# Patient Record
Sex: FEMALE | Race: BLACK OR AFRICAN AMERICAN | Employment: UNEMPLOYED | URBAN - METROPOLITAN AREA
[De-identification: names, ages, dates, MRNs, and addresses within clinical notes are randomized per-mention and may not be internally consistent; named-entity substitution may affect disease eponyms.]

---

## 2023-02-15 ENCOUNTER — HOSPITAL ENCOUNTER (EMERGENCY)
Age: 48
Discharge: NURSING, REHAB OR LONG-TERM CARE | End: 2023-02-15
Attending: EMERGENCY MEDICINE
Payer: MEDICARE

## 2023-02-15 ENCOUNTER — APPOINTMENT (OUTPATIENT)
Dept: GENERAL RADIOLOGY | Age: 48
End: 2023-02-15
Attending: EMERGENCY MEDICINE
Payer: MEDICARE

## 2023-02-15 VITALS
RESPIRATION RATE: 16 BRPM | OXYGEN SATURATION: 98 % | TEMPERATURE: 99 F | HEIGHT: 58 IN | WEIGHT: 180 LBS | HEART RATE: 112 BPM | SYSTOLIC BLOOD PRESSURE: 120 MMHG | BODY MASS INDEX: 37.79 KG/M2 | DIASTOLIC BLOOD PRESSURE: 71 MMHG

## 2023-02-15 DIAGNOSIS — J98.11 ATELECTASIS: ICD-10-CM

## 2023-02-15 DIAGNOSIS — N39.0 URINARY TRACT INFECTION WITHOUT HEMATURIA, SITE UNSPECIFIED: Primary | ICD-10-CM

## 2023-02-15 DIAGNOSIS — E87.0 HYPERNATREMIA: ICD-10-CM

## 2023-02-15 LAB
ALBUMIN SERPL-MCNC: 3.3 G/DL (ref 3.5–5)
ALBUMIN/GLOB SERPL: 0.6 (ref 1.1–2.2)
ALP SERPL-CCNC: 86 U/L (ref 45–117)
ALT SERPL-CCNC: 22 U/L (ref 12–78)
ANION GAP SERPL CALC-SCNC: 7 MMOL/L (ref 5–15)
APPEARANCE UR: ABNORMAL
AST SERPL W P-5'-P-CCNC: ABNORMAL U/L (ref 15–37)
BACTERIA URNS QL MICRO: ABNORMAL /HPF
BASOPHILS # BLD: 0 K/UL (ref 0–0.1)
BASOPHILS NFR BLD: 0 % (ref 0–1)
BILIRUB SERPL-MCNC: 0.9 MG/DL (ref 0.2–1)
BILIRUB UR QL CFM: NEGATIVE
BUN SERPL-MCNC: 19 MG/DL (ref 6–20)
BUN/CREAT SERPL: 22 (ref 12–20)
CA-I BLD-MCNC: 9.8 MG/DL (ref 8.5–10.1)
CHLORIDE SERPL-SCNC: 115 MMOL/L (ref 97–108)
CO2 SERPL-SCNC: 26 MMOL/L (ref 21–32)
COLOR UR: ABNORMAL
CREAT SERPL-MCNC: 0.86 MG/DL (ref 0.55–1.02)
DIFFERENTIAL METHOD BLD: ABNORMAL
EOSINOPHIL # BLD: 0 K/UL (ref 0–0.4)
EOSINOPHIL NFR BLD: 0 % (ref 0–7)
EPITH CASTS URNS QL MICRO: ABNORMAL /LPF
ERYTHROCYTE [DISTWIDTH] IN BLOOD BY AUTOMATED COUNT: 16.3 % (ref 11.5–14.5)
FLUAV AG NPH QL IA: NEGATIVE
FLUBV AG NOSE QL IA: NEGATIVE
GLOBULIN SER CALC-MCNC: 5.4 G/DL (ref 2–4)
GLUCOSE SERPL-MCNC: 116 MG/DL (ref 65–100)
GLUCOSE UR STRIP.AUTO-MCNC: NEGATIVE MG/DL
HCT VFR BLD AUTO: 45.5 % (ref 35–47)
HGB BLD-MCNC: 14.7 G/DL (ref 11.5–16)
HGB UR QL STRIP: ABNORMAL
IMM GRANULOCYTES # BLD AUTO: 0.1 K/UL (ref 0–0.04)
IMM GRANULOCYTES NFR BLD AUTO: 1 % (ref 0–0.5)
KETONES UR QL STRIP.AUTO: 40 MG/DL
LACTATE SERPL-SCNC: 2 MMOL/L (ref 0.4–2)
LEUKOCYTE ESTERASE UR QL STRIP.AUTO: ABNORMAL
LYMPHOCYTES # BLD: 0.7 K/UL (ref 0.8–3.5)
LYMPHOCYTES NFR BLD: 7 % (ref 12–49)
MCH RBC QN AUTO: 30.3 PG (ref 26–34)
MCHC RBC AUTO-ENTMCNC: 32.3 G/DL (ref 30–36.5)
MCV RBC AUTO: 93.8 FL (ref 80–99)
MONOCYTES # BLD: 0.4 K/UL (ref 0–1)
MONOCYTES NFR BLD: 4 % (ref 5–13)
MUCOUS THREADS URNS QL MICRO: ABNORMAL /LPF
NEUTS SEG # BLD: 9.4 K/UL (ref 1.8–8)
NEUTS SEG NFR BLD: 88 % (ref 32–75)
NITRITE UR QL STRIP.AUTO: NEGATIVE
NRBC # BLD: 0.02 K/UL (ref 0–0.01)
NRBC BLD-RTO: 0.2 PER 100 WBC
PH UR STRIP: 5
PLATELET # BLD AUTO: 276 K/UL (ref 150–400)
PMV BLD AUTO: 10.4 FL (ref 8.9–12.9)
POTASSIUM SERPL-SCNC: 4.4 MMOL/L (ref 3.5–5.1)
POTASSIUM SERPL-SCNC: ABNORMAL MMOL/L (ref 3.5–5.1)
PROT SERPL-MCNC: 8.7 G/DL (ref 6.4–8.2)
PROT UR STRIP-MCNC: 30 MG/DL
RBC # BLD AUTO: 4.85 M/UL (ref 3.8–5.2)
RBC #/AREA URNS HPF: ABNORMAL /HPF (ref 0–5)
RBC MORPH BLD: ABNORMAL
SARS-COV-2 RDRP RESP QL NAA+PROBE: NOT DETECTED
SODIUM SERPL-SCNC: 148 MMOL/L (ref 136–145)
SP GR UR REFRACTOMETRY: 1.01 (ref 1–1.03)
UA: UC IF INDICATED,UAUC: ABNORMAL
UROBILINOGEN UR QL STRIP.AUTO: 4 EU/DL (ref 0.2–1)
WBC # BLD AUTO: 10.6 K/UL (ref 3.6–11)
WBC URNS QL MICRO: ABNORMAL /HPF (ref 0–4)

## 2023-02-15 PROCEDURE — 87086 URINE CULTURE/COLONY COUNT: CPT

## 2023-02-15 PROCEDURE — 87804 INFLUENZA ASSAY W/OPTIC: CPT

## 2023-02-15 PROCEDURE — 84132 ASSAY OF SERUM POTASSIUM: CPT

## 2023-02-15 PROCEDURE — 74011250636 HC RX REV CODE- 250/636: Performed by: EMERGENCY MEDICINE

## 2023-02-15 PROCEDURE — 87186 SC STD MICRODIL/AGAR DIL: CPT

## 2023-02-15 PROCEDURE — 87040 BLOOD CULTURE FOR BACTERIA: CPT

## 2023-02-15 PROCEDURE — 87077 CULTURE AEROBIC IDENTIFY: CPT

## 2023-02-15 PROCEDURE — 87635 SARS-COV-2 COVID-19 AMP PRB: CPT

## 2023-02-15 PROCEDURE — 74011000250 HC RX REV CODE- 250: Performed by: EMERGENCY MEDICINE

## 2023-02-15 PROCEDURE — 96375 TX/PRO/DX INJ NEW DRUG ADDON: CPT

## 2023-02-15 PROCEDURE — 81001 URINALYSIS AUTO W/SCOPE: CPT

## 2023-02-15 PROCEDURE — 83605 ASSAY OF LACTIC ACID: CPT

## 2023-02-15 PROCEDURE — 85025 COMPLETE CBC W/AUTO DIFF WBC: CPT

## 2023-02-15 PROCEDURE — 71045 X-RAY EXAM CHEST 1 VIEW: CPT

## 2023-02-15 PROCEDURE — 96374 THER/PROPH/DIAG INJ IV PUSH: CPT

## 2023-02-15 PROCEDURE — 99284 EMERGENCY DEPT VISIT MOD MDM: CPT

## 2023-02-15 RX ORDER — CEPHALEXIN 500 MG/1
500 CAPSULE ORAL 3 TIMES DAILY
Qty: 30 CAPSULE | Refills: 0 | Status: SHIPPED | OUTPATIENT
Start: 2023-02-15 | End: 2023-02-15

## 2023-02-15 RX ORDER — SODIUM CHLORIDE 450 MG/100ML
150 INJECTION, SOLUTION INTRAVENOUS CONTINUOUS
Status: DISCONTINUED | OUTPATIENT
Start: 2023-02-15 | End: 2023-02-16 | Stop reason: HOSPADM

## 2023-02-15 RX ORDER — LEVOFLOXACIN 750 MG/1
750 TABLET ORAL DAILY
Qty: 7 TABLET | Refills: 0 | Status: SHIPPED | OUTPATIENT
Start: 2023-02-15 | End: 2023-02-22

## 2023-02-15 RX ORDER — ONDANSETRON 2 MG/ML
4 INJECTION INTRAMUSCULAR; INTRAVENOUS
Status: COMPLETED | OUTPATIENT
Start: 2023-02-15 | End: 2023-02-15

## 2023-02-15 RX ORDER — SODIUM CHLORIDE 450 MG/100ML
1000 INJECTION, SOLUTION INTRAVENOUS ONCE
Status: DISCONTINUED | OUTPATIENT
Start: 2023-02-15 | End: 2023-02-15

## 2023-02-15 RX ADMIN — SODIUM CHLORIDE 500 ML: 9 INJECTION, SOLUTION INTRAVENOUS at 14:09

## 2023-02-15 RX ADMIN — CEFTRIAXONE SODIUM 1 G: 1 INJECTION, POWDER, FOR SOLUTION INTRAMUSCULAR; INTRAVENOUS at 14:09

## 2023-02-15 RX ADMIN — SODIUM CHLORIDE 150 ML/HR: 4.5 INJECTION, SOLUTION INTRAVENOUS at 15:48

## 2023-02-15 RX ADMIN — ONDANSETRON 4 MG: 2 INJECTION INTRAMUSCULAR; INTRAVENOUS at 15:50

## 2023-02-15 NOTE — ED PROVIDER NOTES
[facility] EMERGENCY DEPT  EMERGENCY DEPARTMENT HISTORY AND PHYSICAL EXAM      Date: 2/15/2023  Patient Name: Lili Barriga  MRN: [id number]  [place] 1975  Date of evaluation: 2/15/2023  Provider: Tiffany Chávez DO   Note Started: 12:58 PM 2/15/23  History of Presenting Illness     Chief Complaint   Patient presents with    Vomiting     History Provided By: EMS    HPI: Lili Barriga, 56 y.o. female with unknown history who presents from a group home that is unknown as well. History is very limited. Reported that patient has been \"sick\". Reported to have fever. Vague history of possible vomiting. History is limited by patient being nonverbal and poor history from EMS. There are no other complaints, changes, or physical findings at this time. Past History   Past Medical History:  No past medical history on file. Past Surgical History:  No past surgical history on file. Family History:  No family history on file. Social History: Allergies:  No Known Allergies    PCP: None    Current Meds:   Previous Medications    No medications on file     Review of Systems   ROS  Review of Systems   Unable to perform ROS: Patient nonverbal    Positives and pertinent negatives as per HPI. Physical Exam   Vitals  ED Triage Vitals [02/15/23 1255]   ED Encounter Vitals Group      /80      Pulse (Heart Rate) (!) 118      Resp Rate 20      Temp 100.3 °F (37.9 °C)      Temp src       O2 Sat (%) 98 %      Weight 180 lb      Height 4' 10\"      Exam  Constitutional: No acute distress. Well-nourished. Skin: No rash. ENT: No rhinorrhea. No cough. Head is normocephalic and atraumatic. Eye: No proptosis or conjunctival injections. Respiratory: No apparent respiratory distress. Lung sounds are clear. Gastrointestinal: Nondistended. Soft and nontender. Musculoskeletal: No obvious bony deformities. Cardiac: Tachycardic with regular rhythm. 2+ radial pulses. No murmurs.     SCREENINGS               No data recorded      Lab and Diagnostic Study Results   Labs -     Recent Results (from the past 12 hour(s))   INFLUENZA A & B AG (RAPID TEST)    Collection Time: 02/15/23  1:42 PM   Result Value Ref Range    Influenza A Antigen Negative Negative      Influenza B Antigen Negative Negative     COVID-19 RAPID TEST    Collection Time: 02/15/23  1:42 PM   Result Value Ref Range    COVID-19 rapid test Not Detected Not Detected     CBC WITH AUTOMATED DIFF    Collection Time: 02/15/23  1:42 PM   Result Value Ref Range    WBC 10.6 3.6 - 11.0 K/uL    RBC 4.85 3.80 - 5.20 M/uL    HGB 14.7 11.5 - 16.0 g/dL    HCT 45.5 35.0 - 47.0 %    MCV 93.8 80.0 - 99.0 FL    MCH 30.3 26.0 - 34.0 PG    MCHC 32.3 30.0 - 36.5 g/dL    RDW 16.3 (H) 11.5 - 14.5 %    PLATELET 308 869 - 097 K/uL    MPV 10.4 8.9 - 12.9 FL    NRBC 0.2 (H) 0.0  WBC    ABSOLUTE NRBC 0.02 (H) 0.00 - 0.01 K/uL    NEUTROPHILS 88 (H) 32 - 75 %    LYMPHOCYTES 7 (L) 12 - 49 %    MONOCYTES 4 (L) 5 - 13 %    EOSINOPHILS 0 0 - 7 %    BASOPHILS 0 0 - 1 %    IMMATURE GRANULOCYTES 1 (H) 0 - 0.5 %    ABS. NEUTROPHILS 9.4 (H) 1.8 - 8.0 K/UL    ABS. LYMPHOCYTES 0.7 (L) 0.8 - 3.5 K/UL    ABS. MONOCYTES 0.4 0.0 - 1.0 K/UL    ABS. EOSINOPHILS 0.0 0.0 - 0.4 K/UL    ABS. BASOPHILS 0.0 0.0 - 0.1 K/UL    ABS. IMM.  GRANS. 0.1 (H) 0.00 - 0.04 K/UL    DF Smear Scanned      RBC COMMENTS Normocytic, Normochromic     METABOLIC PANEL, COMPREHENSIVE    Collection Time: 02/15/23  1:42 PM   Result Value Ref Range    Sodium 148 (H) 136 - 145 mmol/L    Potassium Hemolysed specimen 3.5 - 5.1 mmol/L    Chloride 115 (H) 97 - 108 mmol/L    CO2 26 21 - 32 mmol/L    Anion gap 7 5 - 15 mmol/L    Glucose 116 (H) 65 - 100 mg/dL    BUN 19 6 - 20 mg/dL    Creatinine 0.86 0.55 - 1.02 mg/dL    BUN/Creatinine ratio 22 (H) 12 - 20      eGFR >60 >60 ml/min/1.73m2    Calcium 9.8 8.5 - 10.1 mg/dL    Bilirubin, total 0.9 0.2 - 1.0 mg/dL    AST (SGOT) Hemolysed specimen 15 - 37 U/L    ALT (SGPT) 22 12 - 78 U/L Alk. phosphatase 86 45 - 117 U/L    Protein, total 8.7 (H) 6.4 - 8.2 g/dL    Albumin 3.3 (L) 3.5 - 5.0 g/dL    Globulin 5.4 (H) 2.0 - 4.0 g/dL    A-G Ratio 0.6 (L) 1.1 - 2.2     LACTIC ACID    Collection Time: 02/15/23  1:42 PM   Result Value Ref Range    Lactic acid 2.0 0.4 - 2.0 mmol/L   URINALYSIS W/ REFLEX CULTURE    Collection Time: 02/15/23  1:58 PM    Specimen: Urine   Result Value Ref Range    Color Dark Yellow      Appearance Hazy (A) Clear      Specific gravity 1.015 1.003 - 1.030      pH (UA) 5.0      Protein 30 (A) Negative mg/dL    Glucose Negative Negative mg/dL    Ketone 40 (A) Negative mg/dL    Blood Small (A) Negative      Urobilinogen 4.0 (H) 0.2 - 1.0 EU/dL    Nitrites Negative Negative      Leukocyte Esterase Small (A) Negative      Bilirubin UA, confirm Negative Negative      RBC 0-5 0 - 5 /hpf    UA:UC IF INDICATED Urine Culture Ordered (A) Culture not indicated by UA result      WBC 10-20 0 - 4 /hpf    Epithelial cells Many (A) Few /lpf    Bacteria 4+ (A) Negative /hpf    Mucus 4+ /lpf   POTASSIUM    Collection Time: 02/15/23  3:00 PM   Result Value Ref Range    Potassium 4.4 3.5 - 5.1 mmol/L     Radiologic Studies:  Non-plain film images such as CT, Ultrasound and MRI are read by the radiologist. Plain radiographic images are visualized and preliminarily interpreted by the ED Provider with the below findings:  I see no acute pathology. No signs of pneumonia. No pneumothorax. Interpretation per the radiologist below, if available at the time of this note:  XR CHEST PORT    Result Date: 2/15/2023  EXAM:  XR CHEST PORT INDICATION: Fever, weakness COMPARISON: none TECHNIQUE: portable chest AP view FINDINGS: The cardiac silhouette is within normal limits. The pulmonary vasculature is within normal limits. Bilateral lower lobe atelectasis/minimal infiltrates are noted. The visualized bones and upper abdomen are age-appropriate. Bilateral lower lobe atelectasis/minimal infiltrates. [unfilled]  CT Results  (Last 48 hours)      None          CXR Results  (Last 48 hours)                 02/15/23 1329  XR CHEST PORT Final result    Impression:      Bilateral lower lobe atelectasis/minimal infiltrates. Narrative:  EXAM:  XR CHEST PORT       INDICATION: Fever, weakness       COMPARISON: none       TECHNIQUE: portable chest AP view       FINDINGS: The cardiac silhouette is within normal limits. The pulmonary   vasculature is within normal limits. Bilateral lower lobe atelectasis/minimal infiltrates are noted. The visualized   bones and upper abdomen are age-appropriate. MDM (EMERGENCY DEPARTMENT COURSE and DIFFERENTIAL DIAGNOSIS)   - I am the first and primary provider for this patient AND AM THE PRIMARY PROVIDER OF RECORD. I reviewed the vital signs, available nursing notes, past medical history, past surgical history, family history and social history. - Initial assessment performed. The patients presenting problems have been discussed, and the staff are in agreement with the care plan formulated and outlined with them. I have encouraged them to ask questions as they arise throughout their visit.     Vitals:    Vitals:    02/15/23 1255   BP: 131/80   Pulse: (!) 118   Resp: 20   Temp: 100.3 °F (37.9 °C)   SpO2: 98%   Weight: 81.6 kg (180 lb)   Height: 4' 10\" (1.473 m)      Patient was given the following medications:  Medications   0.45% sodium chloride infusion (150 mL/hr IntraVENous New Bag 2/15/23 1548)   sodium chloride 0.9 % bolus infusion 500 mL (0 mL IntraVENous IV Completed 2/15/23 1435)   cefTRIAXone (ROCEPHIN) 1 g in sterile water (preservative free) 10 mL IV syringe (1 g IntraVENous Given 2/15/23 1409)   ondansetron (ZOFRAN) injection 4 mg (4 mg IntraVENous Given 2/15/23 1550)       CONSULTS: (who and what was discussed)  None     Chronic Conditions: unknown  Social Determinants affecting Dx or Tx: None  Counseling:     Records Reviewed (source and summary of external notes): None    CC/HPI Summary: Presented with fever. Temp is mildly elevated at 100.3. Heart rate is 118. History limited by patient being nonverbal.  DDx: Sepsis, UTI, bacteremia, dehydration, viral syndrome  ED Course and Reassessment: Basic tests and labs have been ordered including sepsis labs. She does not meet sepsis criteria currently however. Patient given 1 g of Rocephin to cover for possible UTI.  500 cc bolus of normal saline ordered as well. Patient does have a UTI. X-ray concerning for likely atelectasis. I do not have significant suspicion for pneumonia. Patient has not had cough. Given the poor history and the mild elevated temperature will prescribe levofloxacin to cover for UTI and possible pneumonia. Disposition Considerations (Tests not done, Shared Decision Making, Pt Expectation of Test or Treatment.):  I did consider possible admission. Patient has mild elevated temperature and UTI. She may or may not have a pneumonia as well. She is chronically ill. I do think that outpatient treatment is reasonable at this time however. Patient is not septic. No leukocytosis. Procedures   Unless otherwise noted below, none  Performed by: Reema Mae DO   Procedures      Procedures:     CRITICAL CARE TIME   CRITICAL CARE NOTE :      Reema Mae DO   Disposition/Plan   Disposition: Discharged    DISCHARGE PLAN:  1. There are no discharge medications for this patient. 2.   Follow-up Information       Follow up With Specialties Details Why Contact Info    Primary care doctor  Schedule an appointment as soon as possible for a visit in 3 days      [facility] EMERGENCY DEPT Emergency Medicine Go today As soon as possible if symptoms worsen [address] [address] [phone]          3. Return to ED if worse   4.    Current Discharge Medication List        START taking these medications    Details   cephALEXin (Keflex) 500 mg capsule Take 1 Capsule by mouth three (3) times daily for 10 days. Qty: 30 Capsule, Refills: 0  Start date: 2/15/2023, End date: 2/25/2023              Discharged    PATIENT REFERRED TO:  Follow-up Information       Follow up With Specialties Details Why Contact Info    Primary care doctor  Schedule an appointment as soon as possible for a visit in 3 days      [facility] EMERGENCY DEPT Emergency Medicine Go today As soon as possible if symptoms worsen [address] [facility] [address] [phone]             DISCONTINUED MEDICATIONS:  Current Discharge Medication List        Clinical Impression   Clinical Impression:   1. Urinary tract infection without hematuria, site unspecified    2. Atelectasis    3. Hypernatremia       Attestations:  Deven Hinojosa DO    I am the Primary Clinician of Record. Deven Hinojosa DO (electronically signed)    (Please note that parts of this dictation were completed with voice recognition software. Quite often unanticipated grammatical, syntax, homophones, and other interpretive errors are inadvertently transcribed by the computer software. Please disregards these errors.  Please excuse any errors that have escaped final proofreading.)

## 2023-02-15 NOTE — DISCHARGE INSTRUCTIONS
Thank you! Thank you for allowing me to care for you in the emergency department. It is my goal to provide you with excellent care. If you have not received excellent quality care, please ask to speak to the nurse manager. Please fill out the survey that will come to you by mail or email since we listen to your feedback! Below you will find a list of your tests from today's visit. Should you have any questions, please do not hesitate to call the emergency department. Labs  Recent Results (from the past 12 hour(s))   INFLUENZA A & B AG (RAPID TEST)    Collection Time: 02/15/23  1:42 PM   Result Value Ref Range    Influenza A Antigen Negative Negative      Influenza B Antigen Negative Negative     COVID-19 RAPID TEST    Collection Time: 02/15/23  1:42 PM   Result Value Ref Range    COVID-19 rapid test Not Detected Not Detected     CBC WITH AUTOMATED DIFF    Collection Time: 02/15/23  1:42 PM   Result Value Ref Range    WBC 10.6 3.6 - 11.0 K/uL    RBC 4.85 3.80 - 5.20 M/uL    HGB 14.7 11.5 - 16.0 g/dL    HCT 45.5 35.0 - 47.0 %    MCV 93.8 80.0 - 99.0 FL    MCH 30.3 26.0 - 34.0 PG    MCHC 32.3 30.0 - 36.5 g/dL    RDW 16.3 (H) 11.5 - 14.5 %    PLATELET 009 154 - 621 K/uL    MPV 10.4 8.9 - 12.9 FL    NRBC 0.2 (H) 0.0  WBC    ABSOLUTE NRBC 0.02 (H) 0.00 - 0.01 K/uL    NEUTROPHILS 88 (H) 32 - 75 %    LYMPHOCYTES 7 (L) 12 - 49 %    MONOCYTES 4 (L) 5 - 13 %    EOSINOPHILS 0 0 - 7 %    BASOPHILS 0 0 - 1 %    IMMATURE GRANULOCYTES 1 (H) 0 - 0.5 %    ABS. NEUTROPHILS 9.4 (H) 1.8 - 8.0 K/UL    ABS. LYMPHOCYTES 0.7 (L) 0.8 - 3.5 K/UL    ABS. MONOCYTES 0.4 0.0 - 1.0 K/UL    ABS. EOSINOPHILS 0.0 0.0 - 0.4 K/UL    ABS. BASOPHILS 0.0 0.0 - 0.1 K/UL    ABS. IMM.  GRANS. 0.1 (H) 0.00 - 0.04 K/UL    DF Smear Scanned      RBC COMMENTS Normocytic, Normochromic     METABOLIC PANEL, COMPREHENSIVE    Collection Time: 02/15/23  1:42 PM   Result Value Ref Range    Sodium 148 (H) 136 - 145 mmol/L    Potassium Hemolysed specimen 3.5 - 5.1 mmol/L    Chloride 115 (H) 97 - 108 mmol/L    CO2 26 21 - 32 mmol/L    Anion gap 7 5 - 15 mmol/L    Glucose 116 (H) 65 - 100 mg/dL    BUN 19 6 - 20 mg/dL    Creatinine 0.86 0.55 - 1.02 mg/dL    BUN/Creatinine ratio 22 (H) 12 - 20      eGFR >60 >60 ml/min/1.73m2    Calcium 9.8 8.5 - 10.1 mg/dL    Bilirubin, total 0.9 0.2 - 1.0 mg/dL    AST (SGOT) Hemolysed specimen 15 - 37 U/L    ALT (SGPT) 22 12 - 78 U/L    Alk. phosphatase 86 45 - 117 U/L    Protein, total 8.7 (H) 6.4 - 8.2 g/dL    Albumin 3.3 (L) 3.5 - 5.0 g/dL    Globulin 5.4 (H) 2.0 - 4.0 g/dL    A-G Ratio 0.6 (L) 1.1 - 2.2     LACTIC ACID    Collection Time: 02/15/23  1:42 PM   Result Value Ref Range    Lactic acid 2.0 0.4 - 2.0 mmol/L   URINALYSIS W/ REFLEX CULTURE    Collection Time: 02/15/23  1:58 PM    Specimen: Urine   Result Value Ref Range    Color Dark Yellow      Appearance Hazy (A) Clear      Specific gravity 1.015 1.003 - 1.030      pH (UA) 5.0      Protein 30 (A) Negative mg/dL    Glucose Negative Negative mg/dL    Ketone 40 (A) Negative mg/dL    Blood Small (A) Negative      Urobilinogen 4.0 (H) 0.2 - 1.0 EU/dL    Nitrites Negative Negative      Leukocyte Esterase Small (A) Negative      Bilirubin UA, confirm Negative Negative      RBC 0-5 0 - 5 /hpf    UA:UC IF INDICATED Urine Culture Ordered (A) Culture not indicated by UA result      WBC 10-20 0 - 4 /hpf    Epithelial cells Many (A) Few /lpf    Bacteria 4+ (A) Negative /hpf    Mucus 4+ /lpf   POTASSIUM    Collection Time: 02/15/23  3:00 PM   Result Value Ref Range    Potassium 4.4 3.5 - 5.1 mmol/L       Radiologic Studies  XR CHEST PORT   Final Result      Bilateral lower lobe atelectasis/minimal infiltrates. CT Results  (Last 48 hours)      None          CXR Results  (Last 48 hours)                 02/15/23 1329  XR CHEST PORT Final result    Impression:      Bilateral lower lobe atelectasis/minimal infiltrates.            Narrative:  EXAM:  XR CHEST PORT       INDICATION: Fever, weakness       COMPARISON: none       TECHNIQUE: portable chest AP view       FINDINGS: The cardiac silhouette is within normal limits. The pulmonary   vasculature is within normal limits. Bilateral lower lobe atelectasis/minimal infiltrates are noted. The visualized   bones and upper abdomen are age-appropriate.                 ------------------------------------------------------------------------------------------------------------  The exam and treatment you received in the Emergency Department were for an urgent problem and are not intended as complete care. It is important that you follow-up with a doctor, nurse practitioner, or physician assistant to:  (1) confirm your diagnosis,  (2) re-evaluation of changes in your illness and treatment, and  (3) for ongoing care. Please take your discharge instructions with you when you go to your follow-up appointment. If you have any problem arranging a follow-up appointment, contact the Emergency Department. If your symptoms become worse or you do not improve as expected and you are unable to reach your health care provider, please return to the Emergency Department. We are available 24 hours a day. If a prescription has been provided, please have it filled as soon as possible to prevent a delay in treatment. If you have any questions or reservations about taking the medication due to side effects or interactions with other medications, please call your primary care provider or contact the ER.

## 2023-02-16 NOTE — ED NOTES
Facility called and updated that patient is en route back to facility at this time, patient loaded up by [facility] and leaving. No distress noted, patient stable at discharge, report called by previous nurse.

## 2023-02-18 LAB
BACTERIA SPEC CULT: ABNORMAL
COLONY COUNT,CNT: ABNORMAL
COLONY COUNT,CNT: ABNORMAL
SPECIAL REQUESTS,SREQ: ABNORMAL

## 2023-02-19 LAB
BACTERIA SPEC CULT: NORMAL
SPECIAL REQUESTS,SREQ: NORMAL

## 2023-02-20 ENCOUNTER — APPOINTMENT (OUTPATIENT)
Dept: GENERAL RADIOLOGY | Age: 48
End: 2023-02-20
Attending: EMERGENCY MEDICINE
Payer: MEDICARE

## 2023-02-20 ENCOUNTER — HOSPITAL ENCOUNTER (EMERGENCY)
Age: 48
Discharge: HOME | End: 2023-02-21
Attending: EMERGENCY MEDICINE
Payer: MEDICARE

## 2023-02-20 VITALS
SYSTOLIC BLOOD PRESSURE: 118 MMHG | WEIGHT: 150 LBS | BODY MASS INDEX: 30.24 KG/M2 | RESPIRATION RATE: 17 BRPM | TEMPERATURE: 98.9 F | OXYGEN SATURATION: 100 % | DIASTOLIC BLOOD PRESSURE: 89 MMHG | HEART RATE: 81 BPM | HEIGHT: 59 IN

## 2023-02-20 DIAGNOSIS — E87.0 CHRONIC HYPERNATREMIA: ICD-10-CM

## 2023-02-20 DIAGNOSIS — R62.7 FAILURE TO THRIVE IN ADULT: Primary | ICD-10-CM

## 2023-02-20 LAB
ALBUMIN SERPL-MCNC: 3.3 G/DL (ref 3.5–5)
ALBUMIN/GLOB SERPL: 0.7 (ref 1.1–2.2)
ALP SERPL-CCNC: 73 U/L (ref 45–117)
ALT SERPL-CCNC: 18 U/L (ref 12–78)
ANION GAP SERPL CALC-SCNC: 7 MMOL/L (ref 5–15)
ANION GAP SERPL CALC-SCNC: 7 MMOL/L (ref 5–15)
APPEARANCE UR: ABNORMAL
AST SERPL W P-5'-P-CCNC: 25 U/L (ref 15–37)
ATRIAL RATE: 86 BPM
BACTERIA URNS QL MICRO: NEGATIVE /HPF
BASOPHILS # BLD: 0 K/UL (ref 0–0.1)
BASOPHILS NFR BLD: 0 % (ref 0–1)
BILIRUB SERPL-MCNC: 0.5 MG/DL (ref 0.2–1)
BILIRUB UR QL: NEGATIVE
BUN SERPL-MCNC: 8 MG/DL (ref 6–20)
BUN SERPL-MCNC: 9 MG/DL (ref 6–20)
BUN/CREAT SERPL: 11 (ref 12–20)
BUN/CREAT SERPL: 14 (ref 12–20)
CA-I BLD-MCNC: 8.7 MG/DL (ref 8.5–10.1)
CA-I BLD-MCNC: 9.6 MG/DL (ref 8.5–10.1)
CALCULATED P AXIS, ECG09: 35 DEGREES
CALCULATED R AXIS, ECG10: 62 DEGREES
CALCULATED T AXIS, ECG11: 1 DEGREES
CHLORIDE SERPL-SCNC: 114 MMOL/L (ref 97–108)
CHLORIDE SERPL-SCNC: 118 MMOL/L (ref 97–108)
CO2 SERPL-SCNC: 26 MMOL/L (ref 21–32)
CO2 SERPL-SCNC: 29 MMOL/L (ref 21–32)
COLOR UR: ABNORMAL
CREAT SERPL-MCNC: 0.56 MG/DL (ref 0.55–1.02)
CREAT SERPL-MCNC: 0.8 MG/DL (ref 0.55–1.02)
DIAGNOSIS, 93000: NORMAL
DIFFERENTIAL METHOD BLD: ABNORMAL
EOSINOPHIL # BLD: 0 K/UL (ref 0–0.4)
EOSINOPHIL NFR BLD: 0 % (ref 0–7)
EPITH CASTS URNS QL MICRO: ABNORMAL /LPF
ERYTHROCYTE [DISTWIDTH] IN BLOOD BY AUTOMATED COUNT: 15.4 % (ref 11.5–14.5)
FLUAV AG NPH QL IA: NEGATIVE
FLUBV AG NOSE QL IA: NEGATIVE
GLOBULIN SER CALC-MCNC: 5 G/DL (ref 2–4)
GLUCOSE SERPL-MCNC: 84 MG/DL (ref 65–100)
GLUCOSE SERPL-MCNC: 99 MG/DL (ref 65–100)
GLUCOSE UR STRIP.AUTO-MCNC: NEGATIVE MG/DL
HCT VFR BLD AUTO: 47.1 % (ref 35–47)
HGB BLD-MCNC: 14.8 G/DL (ref 11.5–16)
HGB UR QL STRIP: NEGATIVE
IMM GRANULOCYTES # BLD AUTO: 0 K/UL
IMM GRANULOCYTES NFR BLD AUTO: 0 %
KETONES UR QL STRIP.AUTO: 20 MG/DL
LACTATE SERPL-SCNC: 1.7 MMOL/L (ref 0.4–2)
LACTATE SERPL-SCNC: 3 MMOL/L (ref 0.4–2)
LEUKOCYTE ESTERASE UR QL STRIP.AUTO: NEGATIVE
LYMPHOCYTES # BLD: 1.5 K/UL (ref 0.8–3.5)
LYMPHOCYTES NFR BLD: 24 % (ref 12–49)
MCH RBC QN AUTO: 29.8 PG (ref 26–34)
MCHC RBC AUTO-ENTMCNC: 31.4 G/DL (ref 30–36.5)
MCV RBC AUTO: 95 FL (ref 80–99)
MONOCYTES # BLD: 0.4 K/UL (ref 0–1)
MONOCYTES NFR BLD: 7 % (ref 5–13)
MUCOUS THREADS URNS QL MICRO: ABNORMAL /LPF
NEUTS BAND NFR BLD MANUAL: 1 % (ref 0–6)
NEUTS SEG # BLD: 4.3 K/UL (ref 1.8–8)
NEUTS SEG NFR BLD: 68 % (ref 32–75)
NITRITE UR QL STRIP.AUTO: NEGATIVE
NRBC # BLD: 0 K/UL (ref 0–0.01)
NRBC BLD-RTO: 0 PER 100 WBC
P-R INTERVAL, ECG05: 110 MS
PH UR STRIP: 5 (ref 5–8)
PLATELET # BLD AUTO: 211 K/UL (ref 150–400)
PMV BLD AUTO: 10.3 FL (ref 8.9–12.9)
POTASSIUM SERPL-SCNC: 3 MMOL/L (ref 3.5–5.1)
POTASSIUM SERPL-SCNC: 3.1 MMOL/L (ref 3.5–5.1)
PROT SERPL-MCNC: 8.3 G/DL (ref 6.4–8.2)
PROT UR STRIP-MCNC: 100 MG/DL
Q-T INTERVAL, ECG07: 338 MS
QRS DURATION, ECG06: 60 MS
QTC CALCULATION (BEZET), ECG08: 404 MS
RBC # BLD AUTO: 4.96 M/UL (ref 3.8–5.2)
RBC #/AREA URNS HPF: ABNORMAL /HPF (ref 0–5)
RBC MORPH BLD: ABNORMAL
SARS-COV-2 RDRP RESP QL NAA+PROBE: NOT DETECTED
SODIUM SERPL-SCNC: 150 MMOL/L (ref 136–145)
SODIUM SERPL-SCNC: 151 MMOL/L (ref 136–145)
SP GR UR REFRACTOMETRY: 1.03 (ref 1–1.03)
TROPONIN I SERPL HS-MCNC: 10 NG/L (ref 0–51)
UA: UC IF INDICATED,UAUC: ABNORMAL
UROBILINOGEN UR QL STRIP.AUTO: 2 EU/DL (ref 0.1–1)
VENTRICULAR RATE, ECG03: 86 BPM
WBC # BLD AUTO: 6.2 K/UL (ref 3.6–11)
WBC URNS QL MICRO: ABNORMAL /HPF (ref 0–4)

## 2023-02-20 PROCEDURE — 93005 ELECTROCARDIOGRAM TRACING: CPT

## 2023-02-20 PROCEDURE — 80048 BASIC METABOLIC PNL TOTAL CA: CPT

## 2023-02-20 PROCEDURE — 74011250636 HC RX REV CODE- 250/636: Performed by: EMERGENCY MEDICINE

## 2023-02-20 PROCEDURE — 83605 ASSAY OF LACTIC ACID: CPT

## 2023-02-20 PROCEDURE — 99285 EMERGENCY DEPT VISIT HI MDM: CPT

## 2023-02-20 PROCEDURE — 80053 COMPREHEN METABOLIC PANEL: CPT

## 2023-02-20 PROCEDURE — 96374 THER/PROPH/DIAG INJ IV PUSH: CPT

## 2023-02-20 PROCEDURE — 87804 INFLUENZA ASSAY W/OPTIC: CPT

## 2023-02-20 PROCEDURE — 81001 URINALYSIS AUTO W/SCOPE: CPT

## 2023-02-20 PROCEDURE — 85025 COMPLETE CBC W/AUTO DIFF WBC: CPT

## 2023-02-20 PROCEDURE — 74011000250 HC RX REV CODE- 250: Performed by: EMERGENCY MEDICINE

## 2023-02-20 PROCEDURE — 87040 BLOOD CULTURE FOR BACTERIA: CPT

## 2023-02-20 PROCEDURE — 84484 ASSAY OF TROPONIN QUANT: CPT

## 2023-02-20 PROCEDURE — 87635 SARS-COV-2 COVID-19 AMP PRB: CPT

## 2023-02-20 PROCEDURE — 71045 X-RAY EXAM CHEST 1 VIEW: CPT

## 2023-02-20 PROCEDURE — 36415 COLL VENOUS BLD VENIPUNCTURE: CPT

## 2023-02-20 PROCEDURE — 96361 HYDRATE IV INFUSION ADD-ON: CPT

## 2023-02-20 RX ORDER — SODIUM CHLORIDE 0.9 % (FLUSH) 0.9 %
5-10 SYRINGE (ML) INJECTION AS NEEDED
Status: DISCONTINUED | OUTPATIENT
Start: 2023-02-20 | End: 2023-02-21 | Stop reason: HOSPADM

## 2023-02-20 RX ADMIN — CEFTRIAXONE SODIUM 1 G: 1 INJECTION, POWDER, FOR SOLUTION INTRAMUSCULAR; INTRAVENOUS at 13:55

## 2023-02-20 RX ADMIN — SODIUM CHLORIDE 2040 ML: 9 INJECTION, SOLUTION INTRAVENOUS at 13:55

## 2023-02-20 NOTE — ED PROVIDER NOTES
[facility] EMERGENCY DEPT  EMERGENCY DEPARTMENT HISTORY AND PHYSICAL EXAM      Date: 2/20/2023  Patient Name: Rhoda Shaw  MRN: [id number]  [place] 1975  Date of evaluation: 2/20/2023  Provider: Po Quiñones MD   Note Started: 12:32 PM 2/20/23    HISTORY OF PRESENT ILLNESS     Chief Complaint   Patient presents with    Fatigue       History Provided By: EMS    HPI: Rhoda Shaw, 56 y.o. female presents via EMS from her group home for not eating or drinking. EMS states they were told the patient was diagnosed with UTI last week and put on antibiotics but they do not feel she is getting any better. Patient otherwise at baseline per EMS, additional history limited as patient is nonverbal at baseline. PAST MEDICAL HISTORY   Past Medical History:  Past Medical History:   Diagnosis Date    Cerebral palsy ([address]     HTN (hypertension)     Microcephalic ([address]     Seizure ([facility] [address]        Past Surgical History:  No past surgical history on file. Family History:  History reviewed. No pertinent family history. Social History: Allergies:  No Known Allergies    PCP: Ankita Mckinney MD    Current Meds:   Previous Medications    LEVOFLOXACIN (LEVAQUIN) 750 MG TABLET    Take 1 Tablet by mouth daily for 7 days. REVIEW OF SYSTEMS   Review of Systems  Positives and Pertinent negatives as per HPI. PHYSICAL EXAM     ED Triage Vitals [02/20/23 1206]   ED Encounter Vitals Group      BP (!) 144/111      Pulse (Heart Rate) 93      Resp Rate 14      Temp 98.9 °F (37.2 °C)      Temp src       O2 Sat (%) 98 %      Weight 150 lb      Height 4' 11\"      Physical Exam  Physical Exam  Constitutional:       General: Chronically ill but not toxic-appearing. HENT:      Head: Normocephalic and atraumatic. Nose: Nose normal.      Mouth/Throat:      Mouth: Mucous membranes are moist.   Eyes:      Extraocular Movements: Extraocular movements intact.       Pupils: Pupils are equal, round, and reactive to light. Cardiovascular:      Rate and Rhythm: Normal rate. Pulses: Normal pulses. Pulmonary:      Effort: Pulmonary effort is normal.      Breath sounds: No stridor. Abdominal:      General: Abdomen is flat. There is no distension. Musculoskeletal:         General: Normal range of motion. Cervical back: Normal range of motion and neck supple. Skin:     General: Skin is warm and dry. Capillary Refill: Capillary refill takes less than 2 seconds. Neurological:      General: Chronic appearing contractures     Mental Status: Responds to voice  Psychiatric:         Mood and Affect: Unable to test due to nonverbal status    SCREENINGS               No data recorded      LAB, EKG AND DIAGNOSTIC RESULTS   Labs:  Recent Results (from the past 12 hour(s))   LACTIC ACID    Collection Time: 02/20/23 12:41 PM   Result Value Ref Range    Lactic acid 3.0 (HH) 0.4 - 2.0 mmol/L   CBC WITH AUTOMATED DIFF    Collection Time: 02/20/23 12:41 PM   Result Value Ref Range    WBC 6.2 3.6 - 11.0 K/uL    RBC 4.96 3.80 - 5.20 M/uL    HGB 14.8 11.5 - 16.0 g/dL    HCT 47.1 (H) 35.0 - 47.0 %    MCV 95.0 80.0 - 99.0 FL    MCH 29.8 26.0 - 34.0 PG    MCHC 31.4 30.0 - 36.5 g/dL    RDW 15.4 (H) 11.5 - 14.5 %    PLATELET 113 460 - 930 K/uL    MPV 10.3 8.9 - 12.9 FL    NRBC 0.0 0.0  WBC    ABSOLUTE NRBC 0.00 0.00 - 0.01 K/uL    NEUTROPHILS 68 32 - 75 %    BAND NEUTROPHILS 1 0 - 6 %    LYMPHOCYTES 24 12 - 49 %    MONOCYTES 7 5 - 13 %    EOSINOPHILS 0 0 - 7 %    BASOPHILS 0 0 - 1 %    IMMATURE GRANULOCYTES 0 %    ABS. NEUTROPHILS 4.3 1.8 - 8.0 K/UL    ABS. LYMPHOCYTES 1.5 0.8 - 3.5 K/UL    ABS. MONOCYTES 0.4 0.0 - 1.0 K/UL    ABS. EOSINOPHILS 0.0 0.0 - 0.4 K/UL    ABS. BASOPHILS 0.0 0.0 - 0.1 K/UL    ABS. IMM.  GRANS. 0.0 K/UL    DF Manual      RBC COMMENTS Normocytic, Normochromic     METABOLIC PANEL, COMPREHENSIVE    Collection Time: 02/20/23 12:41 PM   Result Value Ref Range    Sodium 150 (H) 136 - 145 mmol/L    Potassium 3.1 (L) 3.5 - 5.1 mmol/L    Chloride 114 (H) 97 - 108 mmol/L    CO2 29 21 - 32 mmol/L    Anion gap 7 5 - 15 mmol/L    Glucose 99 65 - 100 mg/dL    BUN 9 6 - 20 mg/dL    Creatinine 0.80 0.55 - 1.02 mg/dL    BUN/Creatinine ratio 11 (L) 12 - 20      eGFR >60 >60 ml/min/1.73m2    Calcium 9.6 8.5 - 10.1 mg/dL    Bilirubin, total 0.5 0.2 - 1.0 mg/dL    AST (SGOT) 25 15 - 37 U/L    ALT (SGPT) 18 12 - 78 U/L    Alk.  phosphatase 73 45 - 117 U/L    Protein, total 8.3 (H) 6.4 - 8.2 g/dL    Albumin 3.3 (L) 3.5 - 5.0 g/dL    Globulin 5.0 (H) 2.0 - 4.0 g/dL    A-G Ratio 0.7 (L) 1.1 - 2.2     TROPONIN-HIGH SENSITIVITY    Collection Time: 02/20/23 12:41 PM   Result Value Ref Range    Troponin-High Sensitivity 10 0 - 51 ng/L   URINALYSIS W/ REFLEX CULTURE    Collection Time: 02/20/23 12:42 PM    Specimen: Urine   Result Value Ref Range    Color Dark Yellow      Appearance Turbid (A) Clear      Specific gravity 1.028 1.003 - 1.030      pH (UA) 5.0 5.0 - 8.0      Protein 100 (A) Negative mg/dL    Glucose Negative Negative mg/dL    Ketone 20 (A) Negative mg/dL    Bilirubin Negative Negative      Blood Negative Negative      Urobilinogen 2.0 (H) 0.1 - 1.0 EU/dL    Nitrites Negative Negative      Leukocyte Esterase Negative Negative      UA:UC IF INDICATED Culture not indicated by UA result Culture not indicated by UA result      WBC 5-10 0 - 4 /hpf    RBC 0-5 0 - 5 /hpf    Epithelial cells Moderate (A) Few /lpf    Bacteria Negative Negative /hpf    Mucus 4+ /lpf   COVID-19 RAPID TEST    Collection Time: 02/20/23  3:03 PM   Result Value Ref Range    COVID-19 rapid test Not Detected Not Detected     INFLUENZA A & B AG (RAPID TEST)    Collection Time: 02/20/23  3:03 PM   Result Value Ref Range    Influenza A Antigen Negative Negative      Influenza B Antigen Negative Negative     EKG, 12 LEAD, INITIAL    Collection Time: 02/20/23  3:13 PM   Result Value Ref Range    Ventricular Rate 86 BPM    Atrial Rate 86 BPM    P-R Interval 110 ms QRS Duration 60 ms    Q-T Interval 338 ms    QTC Calculation (Bezet) 404 ms    Calculated P Axis 35 degrees    Calculated R Axis 62 degrees    Calculated T Axis 1 degrees    Diagnosis       Sinus rhythm with short SD  Nonspecific T wave abnormality  Abnormal ECG  No previous ECGs available  Confirmed by Molly Whitehead ([id number]) on 2/20/2023 4:60:88 PM     METABOLIC PANEL, BASIC    Collection Time: 02/20/23  5:31 PM   Result Value Ref Range    Sodium 151 (H) 136 - 145 mmol/L    Potassium 3.0 (L) 3.5 - 5.1 mmol/L    Chloride 118 (H) 97 - 108 mmol/L    CO2 26 21 - 32 mmol/L    Anion gap 7 5 - 15 mmol/L    Glucose 84 65 - 100 mg/dL    BUN 8 6 - 20 mg/dL    Creatinine 0.56 0.55 - 1.02 mg/dL    BUN/Creatinine ratio 14 12 - 20      eGFR >60 >60 ml/min/1.73m2    Calcium 8.7 8.5 - 10.1 mg/dL   LACTIC ACID    Collection Time: 02/20/23  5:31 PM   Result Value Ref Range    Lactic acid 1.7 0.4 - 2.0 mmol/L       EKG: Initial EKG interpreted by me. Shows     Radiologic Studies:  Non-plain film images such as CT, Ultrasound and MRI are read by the radiologist. Plain radiographic images are visualized and preliminarily interpreted by the ED Provider with the below findings:        Interpretation per the Radiologist below, if available at the time of this note:  XR CHEST PORT    Result Date: 2/20/2023  PORTABLE CHEST RADIOGRAPH/S: 2/20/2023 1:01 PM INDICATION: Pneumonia. COMPARISON: 2/15/2023. TECHNIQUE: Portable frontal semiupright radiograph/s of the chest. FINDINGS: The lungs are hypoinflated, and there is left pulmonary vascular crowding. The central airways are patent. No pneumothorax and no large pleural effusion. No acute process.           EMERGENCY DEPARTMENT COURSE and DIFFERENTIAL DIAGNOSIS/MDM   Vitals:    Vitals:    02/20/23 1512 02/20/23 1742 02/20/23 1747 02/20/23 1812   BP:  115/85 115/85    Pulse: 87  86 76   Resp: 15  16 11   Temp:       SpO2:   100%    Weight:       Height:            Patient was given the following medications:  Medications   sodium chloride (NS) flush 5-10 mL (has no administration in time range)   sodium chloride 0.9 % bolus infusion 2,040 mL (0 mL/kg × 68 kg IntraVENous IV Completed 2/20/23 7198)   cefTRIAXone (ROCEPHIN) 1 g in sterile water (preservative free) 10 mL IV syringe (1 g IntraVENous Given 2/20/23 8465)       CONSULTS: (Who and What was discussed)  None     Chronic Conditions: Cerebral palsy, nonverbal status  Social Determinants affecting Dx or Tx: None  Counseling:      Records Reviewed (source and summary of external notes): Prior medical records and Nursing notes    CC/HPI Summary, DDx, ED Course, and Reassessment: Patient brought from group home for concern of decline in mental status after recent diagnosis of UTI. Caregiver states that they try to feed her but she does not like eating, does drink liquids. Caregiver states patient is basically bedbound at baseline. Will evaluate for continued UTI versus other infectious etiology. Disposition Considerations (Tests not done, Shared Decision Making, Pt Expectation of Test or Treatment.):  Discussed case at length with caregiver. Patient appears to be slowly declining and eating less. Discussed plan to follow-up closely with primary care or GI (referral given) to be evaluated for PEG tube placement and additional work-up as an outpatient. Follow-up and return precautions discussed at length with caregiver. ED FINAL IMPRESSION     1. Failure to thrive in adult    2. Chronic hypernatremia          DISPOSITION/PLAN   Discharged    Discharge Note: The patient is stable for discharge home. The signs, symptoms, diagnosis, and discharge instructions have been discussed, understanding conveyed, and agreed upon. The patient is to follow up as recommended or return to ER should their symptoms worsen.       PATIENT REFERRED TO:  Follow-up Information       Follow up With Specialties Details Why Syed Boykin Mikayla Obando MD Internal Medicine Physician In 1 day Or follow-up with GI to evaluate for PEG tube placement. [facility]      Yanci Ruff MD Gastroenterology, Internal Medicine Physician In 1 day Or follow-up with primary care to evaluate for PEG tube placement. [id number]  [phone]                DISCHARGE MEDICATIONS:  Current Discharge Medication List            DISCONTINUED MEDICATIONS:  Current Discharge Medication List          I am the Primary Clinician of Record. Floresita Fleming MD (electronically signed)    (Please note that parts of this dictation were completed with voice recognition software. Quite often unanticipated grammatical, syntax, homophones, and other interpretive errors are inadvertently transcribed by the computer software. Please disregards these errors.  Please excuse any errors that have escaped final proofreading.)

## 2023-02-20 NOTE — ED NOTES
IV attempted in left hand. Small amount of blood noted in tube.  Pt presents with poor vascular anatomy

## 2023-02-20 NOTE — DISCHARGE INSTRUCTIONS
Thank you! Thank you for allowing me to care for you in the emergency department. It is my goal to provide you with excellent care. If you have not received excellent quality care, please ask to speak to the nurse manager. Please fill out the survey that will come to you by mail or email since we listen to your feedback! Below you will find a list of your tests from today's visit. Should you have any questions, please do not hesitate to call the emergency department. Labs  Recent Results (from the past 12 hour(s))   LACTIC ACID    Collection Time: 02/20/23 12:41 PM   Result Value Ref Range    Lactic acid 3.0 (HH) 0.4 - 2.0 mmol/L   CBC WITH AUTOMATED DIFF    Collection Time: 02/20/23 12:41 PM   Result Value Ref Range    WBC 6.2 3.6 - 11.0 K/uL    RBC 4.96 3.80 - 5.20 M/uL    HGB 14.8 11.5 - 16.0 g/dL    HCT 47.1 (H) 35.0 - 47.0 %    MCV 95.0 80.0 - 99.0 FL    MCH 29.8 26.0 - 34.0 PG    MCHC 31.4 30.0 - 36.5 g/dL    RDW 15.4 (H) 11.5 - 14.5 %    PLATELET 836 973 - 433 K/uL    MPV 10.3 8.9 - 12.9 FL    NRBC 0.0 0.0  WBC    ABSOLUTE NRBC 0.00 0.00 - 0.01 K/uL    NEUTROPHILS 68 32 - 75 %    BAND NEUTROPHILS 1 0 - 6 %    LYMPHOCYTES 24 12 - 49 %    MONOCYTES 7 5 - 13 %    EOSINOPHILS 0 0 - 7 %    BASOPHILS 0 0 - 1 %    IMMATURE GRANULOCYTES 0 %    ABS. NEUTROPHILS 4.3 1.8 - 8.0 K/UL    ABS. LYMPHOCYTES 1.5 0.8 - 3.5 K/UL    ABS. MONOCYTES 0.4 0.0 - 1.0 K/UL    ABS. EOSINOPHILS 0.0 0.0 - 0.4 K/UL    ABS. BASOPHILS 0.0 0.0 - 0.1 K/UL    ABS. IMM.  GRANS. 0.0 K/UL    DF Manual      RBC COMMENTS Normocytic, Normochromic     METABOLIC PANEL, COMPREHENSIVE    Collection Time: 02/20/23 12:41 PM   Result Value Ref Range    Sodium 150 (H) 136 - 145 mmol/L    Potassium 3.1 (L) 3.5 - 5.1 mmol/L    Chloride 114 (H) 97 - 108 mmol/L    CO2 29 21 - 32 mmol/L    Anion gap 7 5 - 15 mmol/L    Glucose 99 65 - 100 mg/dL    BUN 9 6 - 20 mg/dL    Creatinine 0.80 0.55 - 1.02 mg/dL    BUN/Creatinine ratio 11 (L) 12 - 20 eGFR >60 >60 ml/min/1.73m2    Calcium 9.6 8.5 - 10.1 mg/dL    Bilirubin, total 0.5 0.2 - 1.0 mg/dL    AST (SGOT) 25 15 - 37 U/L    ALT (SGPT) 18 12 - 78 U/L    Alk.  phosphatase 73 45 - 117 U/L    Protein, total 8.3 (H) 6.4 - 8.2 g/dL    Albumin 3.3 (L) 3.5 - 5.0 g/dL    Globulin 5.0 (H) 2.0 - 4.0 g/dL    A-G Ratio 0.7 (L) 1.1 - 2.2     TROPONIN-HIGH SENSITIVITY    Collection Time: 02/20/23 12:41 PM   Result Value Ref Range    Troponin-High Sensitivity 10 0 - 51 ng/L   URINALYSIS W/ REFLEX CULTURE    Collection Time: 02/20/23 12:42 PM    Specimen: Urine   Result Value Ref Range    Color Dark Yellow      Appearance Turbid (A) Clear      Specific gravity 1.028 1.003 - 1.030      pH (UA) 5.0 5.0 - 8.0      Protein 100 (A) Negative mg/dL    Glucose Negative Negative mg/dL    Ketone 20 (A) Negative mg/dL    Bilirubin Negative Negative      Blood Negative Negative      Urobilinogen 2.0 (H) 0.1 - 1.0 EU/dL    Nitrites Negative Negative      Leukocyte Esterase Negative Negative      UA:UC IF INDICATED Culture not indicated by UA result Culture not indicated by UA result      WBC 5-10 0 - 4 /hpf    RBC 0-5 0 - 5 /hpf    Epithelial cells Moderate (A) Few /lpf    Bacteria Negative Negative /hpf    Mucus 4+ /lpf   COVID-19 RAPID TEST    Collection Time: 02/20/23  3:03 PM   Result Value Ref Range    COVID-19 rapid test Not Detected Not Detected     INFLUENZA A & B AG (RAPID TEST)    Collection Time: 02/20/23  3:03 PM   Result Value Ref Range    Influenza A Antigen Negative Negative      Influenza B Antigen Negative Negative     EKG, 12 LEAD, INITIAL    Collection Time: 02/20/23  3:13 PM   Result Value Ref Range    Ventricular Rate 86 BPM    Atrial Rate 86 BPM    P-R Interval 110 ms    QRS Duration 60 ms    Q-T Interval 338 ms    QTC Calculation (Bezet) 404 ms    Calculated P Axis 35 degrees    Calculated R Axis 62 degrees    Calculated T Axis 1 degrees    Diagnosis       Sinus rhythm with short TX  Nonspecific T wave abnormality  Abnormal ECG  No previous ECGs available  Confirmed by Sukhjinder Braga ([id number]) on 2/20/2023 6:67:69 PM     METABOLIC PANEL, BASIC    Collection Time: 02/20/23  5:31 PM   Result Value Ref Range    Sodium 151 (H) 136 - 145 mmol/L    Potassium 3.0 (L) 3.5 - 5.1 mmol/L    Chloride 118 (H) 97 - 108 mmol/L    CO2 26 21 - 32 mmol/L    Anion gap 7 5 - 15 mmol/L    Glucose 84 65 - 100 mg/dL    BUN 8 6 - 20 mg/dL    Creatinine 0.56 0.55 - 1.02 mg/dL    BUN/Creatinine ratio 14 12 - 20      eGFR >60 >60 ml/min/1.73m2    Calcium 8.7 8.5 - 10.1 mg/dL   LACTIC ACID    Collection Time: 02/20/23  5:31 PM   Result Value Ref Range    Lactic acid 1.7 0.4 - 2.0 mmol/L       Radiologic Studies  XR CHEST PORT   Final Result   No acute process. CT Results  (Last 48 hours)      None          CXR Results  (Last 48 hours)                 02/20/23 1301  XR CHEST PORT Final result    Impression:  No acute process. Narrative:  PORTABLE CHEST RADIOGRAPH/S: 2/20/2023 1:01 PM       INDICATION: Pneumonia. COMPARISON: 2/15/2023. TECHNIQUE: Portable frontal semiupright radiograph/s of the chest.       FINDINGS:    The lungs are hypoinflated, and there is left pulmonary vascular crowding. The   central airways are patent. No pneumothorax and no large pleural effusion.                  ------------------------------------------------------------------------------------------------------------  The exam and treatment you received in the Emergency Department were for an urgent problem and are not intended as complete care. It is important that you follow-up with a doctor, nurse practitioner, or physician assistant to:  (1) confirm your diagnosis,  (2) re-evaluation of changes in your illness and treatment, and  (3) for ongoing care. Please take your discharge instructions with you when you go to your follow-up appointment. If you have any problem arranging a follow-up appointment, contact the Emergency Department. If your symptoms become worse or you do not improve as expected and you are unable to reach your health care provider, please return to the Emergency Department. We are available 24 hours a day. If a prescription has been provided, please have it filled as soon as possible to prevent a delay in treatment. If you have any questions or reservations about taking the medication due to side effects or interactions with other medications, please call your primary care provider or contact the ER.

## 2023-02-20 NOTE — ED TRIAGE NOTES
Per EMS, pt from group home \"[facility] EMS states pt was dx with UTI last week and put on antibiotics. Group home states pt is not eating, drinking or taking her medications and they feel as if she's getting worse. Pt is non verbal at baseline.

## 2023-02-25 ENCOUNTER — APPOINTMENT (OUTPATIENT)
Dept: GENERAL RADIOLOGY | Age: 48
DRG: 871 | End: 2023-02-25
Attending: STUDENT IN AN ORGANIZED HEALTH CARE EDUCATION/TRAINING PROGRAM
Payer: MEDICARE

## 2023-02-25 ENCOUNTER — HOSPITAL ENCOUNTER (INPATIENT)
Age: 48
LOS: 5 days | Discharge: HOME | DRG: 871 | End: 2023-03-03
Attending: STUDENT IN AN ORGANIZED HEALTH CARE EDUCATION/TRAINING PROGRAM | Admitting: INTERNAL MEDICINE
Payer: MEDICARE

## 2023-02-25 ENCOUNTER — APPOINTMENT (OUTPATIENT)
Dept: CT IMAGING | Age: 48
DRG: 871 | End: 2023-02-25
Attending: STUDENT IN AN ORGANIZED HEALTH CARE EDUCATION/TRAINING PROGRAM
Payer: MEDICARE

## 2023-02-25 DIAGNOSIS — N85.8 UTERINE MASS: ICD-10-CM

## 2023-02-25 DIAGNOSIS — E86.0 DEHYDRATION: ICD-10-CM

## 2023-02-25 DIAGNOSIS — R11.2 NAUSEA AND VOMITING, UNSPECIFIED VOMITING TYPE: ICD-10-CM

## 2023-02-25 DIAGNOSIS — U07.1 COVID-19: Primary | ICD-10-CM

## 2023-02-25 LAB
ALBUMIN SERPL-MCNC: 3 G/DL (ref 3.5–5)
ALBUMIN/GLOB SERPL: 0.7 (ref 1.1–2.2)
ALP SERPL-CCNC: 75 U/L (ref 45–117)
ALT SERPL-CCNC: 22 U/L (ref 12–78)
ANION GAP SERPL CALC-SCNC: 7 MMOL/L (ref 5–15)
APPEARANCE UR: ABNORMAL
AST SERPL W P-5'-P-CCNC: 44 U/L (ref 15–37)
BACTERIA URNS QL MICRO: NEGATIVE /HPF
BASOPHILS # BLD: 0 K/UL (ref 0–0.1)
BASOPHILS NFR BLD: 0 % (ref 0–1)
BILIRUB SERPL-MCNC: 0.7 MG/DL (ref 0.2–1)
BILIRUB UR QL: NEGATIVE
BUN SERPL-MCNC: 5 MG/DL (ref 6–20)
BUN/CREAT SERPL: 8 (ref 12–20)
CA-I BLD-MCNC: 9.1 MG/DL (ref 8.5–10.1)
CHLORIDE SERPL-SCNC: 109 MMOL/L (ref 97–108)
CO2 SERPL-SCNC: 32 MMOL/L (ref 21–32)
COLOR UR: ABNORMAL
CREAT SERPL-MCNC: 0.64 MG/DL (ref 0.55–1.02)
DIFFERENTIAL METHOD BLD: ABNORMAL
EOSINOPHIL # BLD: 0 K/UL (ref 0–0.4)
EOSINOPHIL NFR BLD: 0 % (ref 0–7)
EPITH CASTS URNS QL MICRO: ABNORMAL /LPF
ERYTHROCYTE [DISTWIDTH] IN BLOOD BY AUTOMATED COUNT: 16 % (ref 11.5–14.5)
FLUAV AG NPH QL IA: NEGATIVE
FLUBV AG NOSE QL IA: NEGATIVE
GLOBULIN SER CALC-MCNC: 4.6 G/DL (ref 2–4)
GLUCOSE SERPL-MCNC: 90 MG/DL (ref 65–100)
GLUCOSE UR STRIP.AUTO-MCNC: NEGATIVE MG/DL
HCT VFR BLD AUTO: 44.9 % (ref 35–47)
HGB BLD-MCNC: 14.4 G/DL (ref 11.5–16)
HGB UR QL STRIP: NEGATIVE
IMM GRANULOCYTES # BLD AUTO: 0.1 K/UL (ref 0–0.04)
IMM GRANULOCYTES NFR BLD AUTO: 1 % (ref 0–0.5)
KETONES UR QL STRIP.AUTO: 80 MG/DL
LACTATE SERPL-SCNC: 2.2 MMOL/L (ref 0.4–2)
LEUKOCYTE ESTERASE UR QL STRIP.AUTO: ABNORMAL
LYMPHOCYTES # BLD: 0.5 K/UL (ref 0.8–3.5)
LYMPHOCYTES NFR BLD: 6 % (ref 12–49)
MCH RBC QN AUTO: 30 PG (ref 26–34)
MCHC RBC AUTO-ENTMCNC: 32.1 G/DL (ref 30–36.5)
MCV RBC AUTO: 93.5 FL (ref 80–99)
MONOCYTES # BLD: 0.4 K/UL (ref 0–1)
MONOCYTES NFR BLD: 4 % (ref 5–13)
MUCOUS THREADS URNS QL MICRO: ABNORMAL /LPF
NEUTS SEG # BLD: 7.6 K/UL (ref 1.8–8)
NEUTS SEG NFR BLD: 89 % (ref 32–75)
NITRITE UR QL STRIP.AUTO: NEGATIVE
NRBC # BLD: 0 K/UL (ref 0–0.01)
NRBC BLD-RTO: 0 PER 100 WBC
PH UR STRIP: 5 (ref 5–8)
PLATELET # BLD AUTO: 181 K/UL (ref 150–400)
PMV BLD AUTO: 11.5 FL (ref 8.9–12.9)
POTASSIUM SERPL-SCNC: 3.1 MMOL/L (ref 3.5–5.1)
PROT SERPL-MCNC: 7.6 G/DL (ref 6.4–8.2)
PROT UR STRIP-MCNC: 100 MG/DL
RBC # BLD AUTO: 4.8 M/UL (ref 3.8–5.2)
RBC #/AREA URNS HPF: ABNORMAL /HPF (ref 0–5)
SARS-COV-2 RDRP RESP QL NAA+PROBE: DETECTED
SODIUM SERPL-SCNC: 148 MMOL/L (ref 136–145)
SP GR UR REFRACTOMETRY: 1.02 (ref 1–1.03)
UROBILINOGEN UR QL STRIP.AUTO: 2 EU/DL (ref 0.1–1)
WBC # BLD AUTO: 8.5 K/UL (ref 3.6–11)
WBC CASTS URNS QL MICRO: PRESENT
WBC URNS QL MICRO: ABNORMAL /HPF (ref 0–4)

## 2023-02-25 PROCEDURE — 71045 X-RAY EXAM CHEST 1 VIEW: CPT

## 2023-02-25 PROCEDURE — 80053 COMPREHEN METABOLIC PANEL: CPT

## 2023-02-25 PROCEDURE — 96374 THER/PROPH/DIAG INJ IV PUSH: CPT

## 2023-02-25 PROCEDURE — 96375 TX/PRO/DX INJ NEW DRUG ADDON: CPT

## 2023-02-25 PROCEDURE — 74177 CT ABD & PELVIS W/CONTRAST: CPT

## 2023-02-25 PROCEDURE — 74011000250 HC RX REV CODE- 250: Performed by: STUDENT IN AN ORGANIZED HEALTH CARE EDUCATION/TRAINING PROGRAM

## 2023-02-25 PROCEDURE — 83605 ASSAY OF LACTIC ACID: CPT

## 2023-02-25 PROCEDURE — 87040 BLOOD CULTURE FOR BACTERIA: CPT

## 2023-02-25 PROCEDURE — 74011250636 HC RX REV CODE- 250/636: Performed by: STUDENT IN AN ORGANIZED HEALTH CARE EDUCATION/TRAINING PROGRAM

## 2023-02-25 PROCEDURE — 87804 INFLUENZA ASSAY W/OPTIC: CPT

## 2023-02-25 PROCEDURE — 51701 INSERT BLADDER CATHETER: CPT

## 2023-02-25 PROCEDURE — 87635 SARS-COV-2 COVID-19 AMP PRB: CPT

## 2023-02-25 PROCEDURE — 74011000636 HC RX REV CODE- 636: Performed by: STUDENT IN AN ORGANIZED HEALTH CARE EDUCATION/TRAINING PROGRAM

## 2023-02-25 PROCEDURE — 81001 URINALYSIS AUTO W/SCOPE: CPT

## 2023-02-25 PROCEDURE — 96361 HYDRATE IV INFUSION ADD-ON: CPT

## 2023-02-25 PROCEDURE — 99285 EMERGENCY DEPT VISIT HI MDM: CPT

## 2023-02-25 PROCEDURE — 85025 COMPLETE CBC W/AUTO DIFF WBC: CPT

## 2023-02-25 RX ORDER — ONDANSETRON 2 MG/ML
4 INJECTION INTRAMUSCULAR; INTRAVENOUS
Status: COMPLETED | OUTPATIENT
Start: 2023-02-25 | End: 2023-02-25

## 2023-02-25 RX ADMIN — ONDANSETRON 4 MG: 2 INJECTION INTRAMUSCULAR; INTRAVENOUS at 22:54

## 2023-02-25 RX ADMIN — SODIUM CHLORIDE 1000 ML: 9 INJECTION, SOLUTION INTRAVENOUS at 22:50

## 2023-02-25 RX ADMIN — IOPAMIDOL 100 ML: 755 INJECTION, SOLUTION INTRAVENOUS at 22:26

## 2023-02-25 RX ADMIN — CEFTRIAXONE SODIUM 1 G: 1 INJECTION, POWDER, FOR SOLUTION INTRAMUSCULAR; INTRAVENOUS at 22:50

## 2023-02-26 ENCOUNTER — APPOINTMENT (OUTPATIENT)
Dept: ENDOSCOPY | Age: 48
DRG: 871 | End: 2023-02-26
Attending: INTERNAL MEDICINE
Payer: MEDICARE

## 2023-02-26 PROBLEM — R11.2 INTRACTABLE NAUSEA AND VOMITING: Status: ACTIVE | Noted: 2023-02-26

## 2023-02-26 LAB
ANION GAP SERPL CALC-SCNC: 9 MMOL/L (ref 5–15)
BACTERIA SPEC CULT: NORMAL
BUN SERPL-MCNC: 4 MG/DL (ref 6–20)
BUN/CREAT SERPL: 11 (ref 12–20)
CA-I BLD-MCNC: 7.6 MG/DL (ref 8.5–10.1)
CHLORIDE SERPL-SCNC: 115 MMOL/L (ref 97–108)
CO2 SERPL-SCNC: 27 MMOL/L (ref 21–32)
CREAT SERPL-MCNC: 0.37 MG/DL (ref 0.55–1.02)
GLUCOSE SERPL-MCNC: 76 MG/DL (ref 65–100)
LACTATE SERPL-SCNC: 1 MMOL/L (ref 0.4–2)
PHENYTOIN SERPL-MCNC: 1.6 UG/ML (ref 10–20)
POTASSIUM SERPL-SCNC: 2.8 MMOL/L (ref 3.5–5.1)
SODIUM SERPL-SCNC: 151 MMOL/L (ref 136–145)
SPECIAL REQUESTS,SREQ: NORMAL

## 2023-02-26 PROCEDURE — 74011250636 HC RX REV CODE- 250/636: Performed by: INTERNAL MEDICINE

## 2023-02-26 PROCEDURE — 80175 DRUG SCREEN QUAN LAMOTRIGINE: CPT

## 2023-02-26 PROCEDURE — 3E0333Z INTRODUCTION OF ANTI-INFLAMMATORY INTO PERIPHERAL VEIN, PERCUTANEOUS APPROACH: ICD-10-PCS | Performed by: INTERNAL MEDICINE

## 2023-02-26 PROCEDURE — 80185 ASSAY OF PHENYTOIN TOTAL: CPT

## 2023-02-26 PROCEDURE — 87086 URINE CULTURE/COLONY COUNT: CPT

## 2023-02-26 PROCEDURE — 74011250637 HC RX REV CODE- 250/637: Performed by: INTERNAL MEDICINE

## 2023-02-26 PROCEDURE — 65270000029 HC RM PRIVATE

## 2023-02-26 PROCEDURE — 80048 BASIC METABOLIC PNL TOTAL CA: CPT

## 2023-02-26 PROCEDURE — 74011000250 HC RX REV CODE- 250: Performed by: INTERNAL MEDICINE

## 2023-02-26 PROCEDURE — 83605 ASSAY OF LACTIC ACID: CPT

## 2023-02-26 RX ORDER — ONDANSETRON 4 MG/1
4 TABLET, ORALLY DISINTEGRATING ORAL
Status: DISCONTINUED | OUTPATIENT
Start: 2023-02-26 | End: 2023-03-03 | Stop reason: HOSPADM

## 2023-02-26 RX ORDER — MIRTAZAPINE 7.5 MG/1
15 TABLET, FILM COATED ORAL
COMMUNITY

## 2023-02-26 RX ORDER — DIPHENHYDRAMINE HYDROCHLORIDE 50 MG/ML
25 INJECTION, SOLUTION INTRAMUSCULAR; INTRAVENOUS ONCE
Status: COMPLETED | OUTPATIENT
Start: 2023-02-26 | End: 2023-02-26

## 2023-02-26 RX ORDER — SODIUM CHLORIDE, SODIUM LACTATE, POTASSIUM CHLORIDE, CALCIUM CHLORIDE 600; 310; 30; 20 MG/100ML; MG/100ML; MG/100ML; MG/100ML
125 INJECTION, SOLUTION INTRAVENOUS CONTINUOUS
Status: DISPENSED | OUTPATIENT
Start: 2023-02-26 | End: 2023-02-26

## 2023-02-26 RX ORDER — POTASSIUM CHLORIDE 7.45 MG/ML
10 INJECTION INTRAVENOUS
Status: DISPENSED | OUTPATIENT
Start: 2023-02-26 | End: 2023-02-26

## 2023-02-26 RX ORDER — SODIUM CHLORIDE 0.9 % (FLUSH) 0.9 %
5-40 SYRINGE (ML) INJECTION AS NEEDED
Status: DISCONTINUED | OUTPATIENT
Start: 2023-02-26 | End: 2023-03-03 | Stop reason: HOSPADM

## 2023-02-26 RX ORDER — ALBUTEROL SULFATE 90 UG/1
2 AEROSOL, METERED RESPIRATORY (INHALATION)
Status: DISCONTINUED | OUTPATIENT
Start: 2023-02-26 | End: 2023-03-03 | Stop reason: HOSPADM

## 2023-02-26 RX ORDER — ACETAMINOPHEN 325 MG/1
650 TABLET ORAL
Status: DISCONTINUED | OUTPATIENT
Start: 2023-02-26 | End: 2023-03-03 | Stop reason: HOSPADM

## 2023-02-26 RX ORDER — ACETAMINOPHEN 650 MG/1
650 SUPPOSITORY RECTAL
Status: DISCONTINUED | OUTPATIENT
Start: 2023-02-26 | End: 2023-03-03 | Stop reason: HOSPADM

## 2023-02-26 RX ORDER — METOPROLOL TARTRATE 25 MG/1
25 TABLET, FILM COATED ORAL 2 TIMES DAILY
COMMUNITY
End: 2023-03-03

## 2023-02-26 RX ORDER — FLUCONAZOLE 2 MG/ML
800 INJECTION, SOLUTION INTRAVENOUS ONCE
Status: DISCONTINUED | OUTPATIENT
Start: 2023-02-26 | End: 2023-02-26

## 2023-02-26 RX ORDER — SODIUM CHLORIDE 0.9 % (FLUSH) 0.9 %
5-40 SYRINGE (ML) INJECTION EVERY 8 HOURS
Status: DISCONTINUED | OUTPATIENT
Start: 2023-02-26 | End: 2023-03-03 | Stop reason: HOSPADM

## 2023-02-26 RX ORDER — LAMOTRIGINE 200 MG/1
200 TABLET ORAL DAILY
COMMUNITY

## 2023-02-26 RX ORDER — METOCLOPRAMIDE HYDROCHLORIDE 5 MG/ML
10 INJECTION INTRAMUSCULAR; INTRAVENOUS
Status: COMPLETED | OUTPATIENT
Start: 2023-02-26 | End: 2023-02-26

## 2023-02-26 RX ORDER — ONDANSETRON 2 MG/ML
4 INJECTION INTRAMUSCULAR; INTRAVENOUS
Status: DISCONTINUED | OUTPATIENT
Start: 2023-02-26 | End: 2023-03-03 | Stop reason: HOSPADM

## 2023-02-26 RX ORDER — DEXAMETHASONE SODIUM PHOSPHATE 10 MG/ML
6 INJECTION INTRAMUSCULAR; INTRAVENOUS DAILY
Status: DISCONTINUED | OUTPATIENT
Start: 2023-02-26 | End: 2023-02-28

## 2023-02-26 RX ORDER — POTASSIUM CHLORIDE 7.45 MG/ML
10 INJECTION INTRAVENOUS
Status: COMPLETED | OUTPATIENT
Start: 2023-02-26 | End: 2023-02-26

## 2023-02-26 RX ORDER — FLUCONAZOLE 2 MG/ML
400 INJECTION, SOLUTION INTRAVENOUS EVERY 24 HOURS
Status: DISCONTINUED | OUTPATIENT
Start: 2023-02-26 | End: 2023-03-03

## 2023-02-26 RX ORDER — METOCLOPRAMIDE HYDROCHLORIDE 5 MG/ML
5 INJECTION INTRAMUSCULAR; INTRAVENOUS
Status: DISCONTINUED | OUTPATIENT
Start: 2023-02-26 | End: 2023-03-03 | Stop reason: HOSPADM

## 2023-02-26 RX ORDER — PHENYTOIN SODIUM 100 MG/1
200 CAPSULE, EXTENDED RELEASE ORAL DAILY
COMMUNITY
End: 2023-03-03

## 2023-02-26 RX ORDER — POLYETHYLENE GLYCOL 3350 17 G/17G
17 POWDER, FOR SOLUTION ORAL DAILY PRN
Status: DISCONTINUED | OUTPATIENT
Start: 2023-02-26 | End: 2023-03-03 | Stop reason: HOSPADM

## 2023-02-26 RX ORDER — FLUCONAZOLE 2 MG/ML
400 INJECTION, SOLUTION INTRAVENOUS DAILY
Status: DISCONTINUED | OUTPATIENT
Start: 2023-02-27 | End: 2023-02-26

## 2023-02-26 RX ORDER — ENOXAPARIN SODIUM 100 MG/ML
40 INJECTION SUBCUTANEOUS DAILY
Status: DISCONTINUED | OUTPATIENT
Start: 2023-02-26 | End: 2023-03-03 | Stop reason: HOSPADM

## 2023-02-26 RX ADMIN — SODIUM CHLORIDE, PRESERVATIVE FREE 10 ML: 5 INJECTION INTRAVENOUS at 06:26

## 2023-02-26 RX ADMIN — DEXAMETHASONE SODIUM PHOSPHATE 6 MG: 10 INJECTION INTRAMUSCULAR; INTRAVENOUS at 10:03

## 2023-02-26 RX ADMIN — POTASSIUM CHLORIDE 10 MEQ: 7.46 INJECTION, SOLUTION INTRAVENOUS at 06:07

## 2023-02-26 RX ADMIN — SODIUM CHLORIDE, PRESERVATIVE FREE 100 MG PE: 5 INJECTION INTRAVENOUS at 11:54

## 2023-02-26 RX ADMIN — DIPHENHYDRAMINE HYDROCHLORIDE 25 MG: 50 INJECTION INTRAMUSCULAR; INTRAVENOUS at 00:43

## 2023-02-26 RX ADMIN — ENOXAPARIN SODIUM 40 MG: 100 INJECTION SUBCUTANEOUS at 10:03

## 2023-02-26 RX ADMIN — POTASSIUM CHLORIDE 10 MEQ: 7.46 INJECTION, SOLUTION INTRAVENOUS at 10:03

## 2023-02-26 RX ADMIN — POTASSIUM CHLORIDE 10 MEQ: 7.46 INJECTION, SOLUTION INTRAVENOUS at 05:15

## 2023-02-26 RX ADMIN — CEFTRIAXONE 2 G: 2 INJECTION, POWDER, FOR SOLUTION INTRAMUSCULAR; INTRAVENOUS at 15:39

## 2023-02-26 RX ADMIN — FLUCONAZOLE, SODIUM CHLORIDE 400 MG: 2 INJECTION INTRAVENOUS at 02:51

## 2023-02-26 RX ADMIN — SODIUM CHLORIDE, PRESERVATIVE FREE 10 ML: 5 INJECTION INTRAVENOUS at 22:21

## 2023-02-26 RX ADMIN — SODIUM CHLORIDE, POTASSIUM CHLORIDE, SODIUM LACTATE AND CALCIUM CHLORIDE 125 ML/HR: 600; 310; 30; 20 INJECTION, SOLUTION INTRAVENOUS at 01:13

## 2023-02-26 RX ADMIN — SODIUM CHLORIDE, PRESERVATIVE FREE 10 ML: 5 INJECTION INTRAVENOUS at 13:19

## 2023-02-26 RX ADMIN — SODIUM CHLORIDE, PRESERVATIVE FREE 10 ML: 5 INJECTION INTRAVENOUS at 02:49

## 2023-02-26 RX ADMIN — METOCLOPRAMIDE HYDROCHLORIDE 10 MG: 5 INJECTION INTRAMUSCULAR; INTRAVENOUS at 02:48

## 2023-02-26 RX ADMIN — SODIUM CHLORIDE, PRESERVATIVE FREE 100 MG PE: 5 INJECTION INTRAVENOUS at 22:13

## 2023-02-26 RX ADMIN — SODIUM CHLORIDE 1000 ML: 9 INJECTION, SOLUTION INTRAVENOUS at 00:05

## 2023-02-26 RX ADMIN — POTASSIUM CHLORIDE 10 MEQ: 7.46 INJECTION, SOLUTION INTRAVENOUS at 11:53

## 2023-02-26 NOTE — ED NOTES
Straight cath done using sterile technique, only removed 25 ml before urine stopped flowing. This sent to lab. Amount of urine reported to Dr. Javy Friedman.

## 2023-02-26 NOTE — ED NOTES
Spoke with Dr. Padilla Done due to 2nd liter of fluid started, he relays to check repeat lactic acid after 2nd liter finishes.

## 2023-02-26 NOTE — ED PROVIDER NOTES
[facility] EMERGENCY DEPT  EMERGENCY DEPARTMENT HISTORY AND PHYSICAL EXAM      Date: 2/25/2023  Patient Name: Alisson Carreno  MRN: [id number]  [place] 1975  Date of evaluation: 2/25/2023  Provider: Kb Jolly MD   Note Started: 11:14 PM 2/25/23    HISTORY OF PRESENT ILLNESS     Chief Complaint   Patient presents with    Vomiting       History Provided By: Patient    HPI: Alisson Carreno, 56 y.o. female past medical history as reviewed below presents for evaluation of vomiting. Patient has a history of vomiting and is regularly dehydrated due to decreased p.o. intake, scheduled for a PEG placement by Dr. Arcelia Orr next week. No other changes recently. No history available from the patient due to her baseline mental status. PAST MEDICAL HISTORY   Past Medical History:  Past Medical History:   Diagnosis Date    Cerebral palsy ([address]     HTN (hypertension)     Microcephalic ([address]     Seizure ([address]        Past Surgical History:  No past surgical history on file. Family History:  No family history on file. Social History: Allergies:  No Known Allergies    PCP: Leesa Kelly MD    Current Meds:   Previous Medications    No medications on file       REVIEW OF SYSTEMS   Review of Systems  We will perform due to patient's intellectual disability  Positives and Pertinent negatives as per HPI. PHYSICAL EXAM     ED Triage Vitals [02/25/23 2200]   ED Encounter Vitals Group      /86      Pulse (Heart Rate) (!) 110      Resp Rate 18      Temp (!) 102.3 °F (39.1 °C)      Temp src       O2 Sat (%) 97 %      Weight 150 lb      Height 4' 11\"     Physical Exam  Constitutional:       Appearance: Normal appearance. HENT:      Head: Normocephalic and atraumatic. Nose: Nose normal.      Mouth/Throat:      Mouth: Mucous membranes are moist.   Eyes:      Conjunctiva/sclera: Conjunctivae normal.      Pupils: Pupils are equal, round, and reactive to light.    Cardiovascular:      Rate and Rhythm: Normal rate and regular rhythm. Heart sounds: Normal heart sounds. Pulmonary:      Effort: Pulmonary effort is normal.      Breath sounds: Normal breath sounds. Abdominal:      General: There is no distension. Palpations: Abdomen is soft. Tenderness: There is no abdominal tenderness. Musculoskeletal:         General: No tenderness or deformity. Normal range of motion. Cervical back: Normal range of motion and neck supple. Skin:     General: Skin is warm and dry. Neurological:      General: No focal deficit present. Mental Status: She is alert. Mental status is at baseline. Comments: Moving all extremities   Psychiatric:         Mood and Affect: Mood normal.         Behavior: Behavior normal.          SCREENINGS               No data recorded        LAB, EKG AND DIAGNOSTIC RESULTS   Labs:  Recent Results (from the past 12 hour(s))   CBC WITH AUTOMATED DIFF    Collection Time: 02/25/23  9:33 PM   Result Value Ref Range    WBC 8.5 3.6 - 11.0 K/uL    RBC 4.80 3.80 - 5.20 M/uL    HGB 14.4 11.5 - 16.0 g/dL    HCT 44.9 35.0 - 47.0 %    MCV 93.5 80.0 - 99.0 FL    MCH 30.0 26.0 - 34.0 PG    MCHC 32.1 30.0 - 36.5 g/dL    RDW 16.0 (H) 11.5 - 14.5 %    PLATELET 344 571 - 380 K/uL    MPV 11.5 8.9 - 12.9 FL    NRBC 0.0 0.0  WBC    ABSOLUTE NRBC 0.00 0.00 - 0.01 K/uL    NEUTROPHILS 89 (H) 32 - 75 %    LYMPHOCYTES 6 (L) 12 - 49 %    MONOCYTES 4 (L) 5 - 13 %    EOSINOPHILS 0 0 - 7 %    BASOPHILS 0 0 - 1 %    IMMATURE GRANULOCYTES 1 (H) 0 - 0.5 %    ABS. NEUTROPHILS 7.6 1.8 - 8.0 K/UL    ABS. LYMPHOCYTES 0.5 (L) 0.8 - 3.5 K/UL    ABS. MONOCYTES 0.4 0.0 - 1.0 K/UL    ABS. EOSINOPHILS 0.0 0.0 - 0.4 K/UL    ABS. BASOPHILS 0.0 0.0 - 0.1 K/UL    ABS. IMM.  GRANS. 0.1 (H) 0.00 - 0.04 K/UL    DF AUTOMATED     METABOLIC PANEL, COMPREHENSIVE    Collection Time: 02/25/23  9:33 PM   Result Value Ref Range    Sodium 148 (H) 136 - 145 mmol/L    Potassium 3.1 (L) 3.5 - 5.1 mmol/L    Chloride 109 (H) 97 - 108 mmol/L    CO2 32 21 - 32 mmol/L    Anion gap 7 5 - 15 mmol/L    Glucose 90 65 - 100 mg/dL    BUN 5 (L) 6 - 20 mg/dL    Creatinine 0.64 0.55 - 1.02 mg/dL    BUN/Creatinine ratio 8 (L) 12 - 20      eGFR >60 >60 ml/min/1.73m2    Calcium 9.1 8.5 - 10.1 mg/dL    Bilirubin, total 0.7 0.2 - 1.0 mg/dL    AST (SGOT) 44 (H) 15 - 37 U/L    ALT (SGPT) 22 12 - 78 U/L    Alk. phosphatase 75 45 - 117 U/L    Protein, total 7.6 6.4 - 8.2 g/dL    Albumin 3.0 (L) 3.5 - 5.0 g/dL    Globulin 4.6 (H) 2.0 - 4.0 g/dL    A-G Ratio 0.7 (L) 1.1 - 2.2     URINALYSIS W/ RFLX MICROSCOPIC    Collection Time: 02/25/23  9:48 PM   Result Value Ref Range    Color Portia      Appearance Turbid (A) Clear      Specific gravity 1.025 1.003 - 1.030      pH (UA) 5.0 5.0 - 8.0      Protein 100 (A) Negative mg/dL    Glucose Negative Negative mg/dL    Ketone 80 (A) Negative mg/dL    Bilirubin Negative Negative      Blood Negative Negative      Urobilinogen 2.0 (H) 0.1 - 1.0 EU/dL    Nitrites Negative Negative      Leukocyte Esterase Moderate (A) Negative     URINE MICROSCOPIC    Collection Time: 02/25/23  9:48 PM   Result Value Ref Range    WBC  0 - 4 /hpf    RBC 10-20 0 - 5 /hpf    Epithelial cells Moderate (A) Few /lpf    Bacteria Negative Negative /hpf    Mucus 4+ (A) Negative /lpf    Budding yeast Present (A) Negative     COVID-19 RAPID TEST    Collection Time: 02/25/23 10:35 PM   Result Value Ref Range    COVID-19 rapid test DETECTED (A) Not Detected     INFLUENZA A & B AG (RAPID TEST)    Collection Time: 02/25/23 10:35 PM   Result Value Ref Range    Influenza A Antigen Negative Negative      Influenza B Antigen Negative Negative         Radiologic Studies:  Interpretation per the Radiologist below, if available at the time of this note:  CT ABD PELV W CONT    Result Date: 2/25/2023  EXAM: CT ABD PELV W CONT INDICATION: eval persistent vomiting COMPARISON: None CONTRAST: 100 mL of Isovue-370.  ORAL CONTRAST: None TECHNIQUE: Following the uneventful intravenous administration of contrast, thin axial images were obtained through the abdomen and pelvis. Coronal and sagittal reconstructions were generated. CT dose reduction was achieved through use of a standardized protocol tailored for this examination and automatic exposure control for dose modulation. FINDINGS: LOWER THORAX: No significant abnormality in the incidentally imaged lower chest. LIVER: No mass. BILIARY TREE: Gallbladder is within normal limits. CBD is not dilated. SPLEEN: within normal limits. PANCREAS: No mass or ductal dilatation. ADRENALS: Unremarkable. KIDNEYS: No mass, calculus, or hydronephrosis. STOMACH: Unremarkable. SMALL BOWEL: No dilatation or wall thickening. COLON: No dilatation or wall thickening. APPENDIX: Normal PERITONEUM: No ascites or pneumoperitoneum. RETROPERITONEUM: No lymphadenopathy or aortic aneurysm. REPRODUCTIVE ORGANS: Large solid and cystic mass measuring 10.0 x 6.9 cm arising from the uterine myometrium, compressing the endometrial cavity. The uterus is enlarged. URINARY BLADDER: No mass or calculus. BONES: No destructive bone lesion. ABDOMINAL WALL: No mass or hernia. ADDITIONAL COMMENTS: N/A     No bowel obstruction. Large heterogeneous intrauterine mass measuring 10.0 x 6.9 cm concerning for uterine malignancy. XR CHEST PORT    Result Date: 2/25/2023  INDICATION: eval for pneumonia EXAM:  AP CHEST RADIOGRAPH COMPARISON: February 20, 2023 FINDINGS: AP portable view of the chest demonstrates a normal cardiomediastinal silhouette. There is no edema, effusion, consolidation, or pneumothorax. The osseous structures are unremarkable. No acute process.       PROCEDURES   Unless otherwise noted below, none  Performed by: Kathlyn Dance, MD   Procedures        EMERGENCY DEPARTMENT COURSE and DIFFERENTIAL DIAGNOSIS/MDM   Vitals:    Vitals:    02/25/23 2200 02/25/23 2205 02/25/23 2230   BP: 127/86  124/84   Pulse: (!) 110     Resp: 18     Temp: (!) 102.3 °F (39.1 °C)     SpO2: 97% 96% 97%   Weight: 68 kg (150 lb)     Height: 4' 11\" (1.499 m)          Patient was given the following medications:  Medications   sodium chloride 0.9 % bolus infusion 1,000 mL (1,000 mL IntraVENous New Bag 2/25/23 2250)   sodium chloride 0.9 % bolus infusion 1,000 mL (has no administration in time range)   ondansetron (ZOFRAN) injection 4 mg (4 mg IntraVENous Given 2/25/23 2254)   cefTRIAXone (ROCEPHIN) 1 g in sterile water (preservative free) 10 mL IV syringe (1 g IntraVENous Given 2/25/23 2250)   iopamidoL (ISOVUE-370) 370 mg iodine /mL (76 %) injection 100 mL (100 mL IntraVENous Given 2/25/23 2226)       CC/HPI Summary, DDx, ED Course, and Reassessment:   71-year-old female presents for evaluation of vomiting. Known chronic vomiting resulting in dehydration so this could be related to her chronic syndrome, but will further evaluate given that she is febrile on arrival.  Possible infectious sources include urine, bloodstream, pulmonary including viral.  Will evaluate with lab work, blood cultures, UA, chest x-ray and viral swabs. Initial treatment with antibiotics and IV fluids. Spoke with Dr. Chrissy Fletcher who will see her in consult on Monday to possibly expedite her PEG placement. CT scan for further evaluation of intra-abdominal pathology identifies a large heterogenous uterine mass, possibly malignant. Will admit for IV fluids and management of nausea. ED FINAL IMPRESSION     1. COVID-19    2. Nausea and vomiting, unspecified vomiting type    3. Dehydration    4. Uterine mass          DISPOSITION/PLAN       Admit Note: Pt is being admitted by Steffen Edgar. The results of their tests and reason(s) for their admission have been discussed with pt and/or available family. They convey agreement and understanding for the need to be admitted and for the admission diagnosis. I am the Primary Clinician of Record.  Estuardo Zavaleta MD (electronically signed)    (Please note that parts of this dictation were completed with voice recognition software. Quite often unanticipated grammatical, syntax, homophones, and other interpretive errors are inadvertently transcribed by the computer software. Please disregards these errors.  Please excuse any errors that have escaped final proofreading.)

## 2023-02-26 NOTE — ED NOTES
Patient now pulling at IV tubing. IV tubing cobaned up to elbow at this time. Attempted to redirect patient to having a taped up towel to play with.

## 2023-02-26 NOTE — PROGRESS NOTES
Pt admitted from the ED around 0330's and I assumed care. Baseline Pt is nonverbal, no sign or gestures of discomfort. Pt seem calm an relax. VS are stable. On Room air. Pure wick in place. Skin: intact. K2.8  Potassium replacement is progress. Droplet precautions maintained     Will continue care and monitor pt closely.

## 2023-02-26 NOTE — PROGRESS NOTES
Reason for Admission:  infection                     RUR Score: 14%                    Plan for utilizing home health:     as ordered     PCP: First and Last name:  Maritza Taylor MD     Name of Practice:    Are you a current patient: Yes/No:    Approximate date of last visit:    Can you participate in a virtual visit with your PCP:                     Current Advanced Directive/Advance Care Plan: Full Code      Healthcare Decision Maker:   Click here to complete [address] including selection of the [address] Relationship (ie \"Primary\")           CHAD Maxwell@ [phone]                  Transition of Care Plan:     The pt is a resident of [address] [place] [address] manager  [phone]

## 2023-02-26 NOTE — ED TRIAGE NOTES
Presents via EMS for vomiting with coffee ground emesis per report. Pt has been here recently for refusing to eat and drink, scheduled for peg March 3. Pt with CP, alert.

## 2023-02-26 NOTE — ED NOTES
Patient has removed IV tubing from coban at this time and continues pulling at tubing. Dr. Kristyn Benites consulted.

## 2023-02-26 NOTE — ROUTINE PROCESS
TRANSFER - OUT REPORT:    Verbal report given to Faby RN(name) on Zola Rubinstein  being transferred to 579(unit) for routine progression of care       Report consisted of patients Situation, Background, Assessment and   Recommendations(SBAR). Information from the following report(s) SBAR, Kardex, ED Summary, and MAR was reviewed with the receiving nurse. Lines:   Peripheral IV 02/25/23 Left;Posterior Hand (Active)        Opportunity for questions and clarification was provided.       Patient transported with:   [employer]

## 2023-02-26 NOTE — ED NOTES
Bilateral Soft Mitts placed at this time. NV status intact, radial pulses palpable, cap refill less than 3 seconds.

## 2023-02-26 NOTE — PROGRESS NOTES
[facility] Hospitalist Progress Note  Michael Gonzalez MD    Date:2023       Room:[facility]  Patient Eliazar Daniels     YOB: 1975     Age:47 y.o.    23 admission course  Shania Galindo is a 56 y.o. female with PMH of cerebral palsy, cognitive disability, chronic vomiting, hypertension and seizure. Limited history due to underlying disability. She was brought to the ED from facility for evaluation of persistent vomiting and decreased oral intake. Patient has a history of vomiting and is regularly dehydrated due to decreased p.o. intake, scheduled for a PEG placement by Dr. Teri Kenney next week. In the ED, vitals stable. No leukocytosis. UA indicative of UTI. Also hyponatremia and hypokalemia. Lactic acid normalized with resuscitation. COVID-positive. 23 CT abdo pelvis IMPRESSION  No bowel obstruction. Large heterogeneous intrauterine mass measuring 10.0 x 6.9 cm concerning for  uterine malignancy. 23 no new complaints, tolerating medications well    Subjective    Subjective:  Symptoms:  Stable. Review of Systems   All other systems reviewed and are negative. Objective         Vitals Last 24 Hours:  TEMPERATURE:  Temp  Av.9 °F (37.7 °C)  Min: 98 °F (36.7 °C)  Max: 102.3 °F (39.1 °C)  RESPIRATIONS RANGE: Resp  Av.5  Min: 16  Max: 18  PULSE OXIMETRY RANGE: SpO2  Av.9 %  Min: 94 %  Max: 98 %  PULSE RANGE: Pulse  Av  Min: 85  Max: 110  BLOOD PRESSURE RANGE: Systolic (07RJO), IOZ:778 , Min:101 , KIW:253   ; Diastolic (50CZH), JQS:50, Min:68, Max:87    I/O (24Hr): Intake/Output Summary (Last 24 hours) at 2023 1442  Last data filed at 2023 1054  Gross per 24 hour   Intake 2500 ml   Output 650 ml   Net 1850 ml     Objective:  General Appearance:  Comfortable. Vital signs: (most recent): Blood pressure 112/75, pulse 85, temperature 99.6 °F (37.6 °C), resp. rate 16, height 4' 11\" (1.499 m), weight 68 kg (150 lb), SpO2 98 %.     General: alart but not cooperative, no distress  Eye: conjunctivae/corneas clear. PERRL, EOM's intact. Throat and Neck: Omitted as patient is unable to cooperate. Lung: clear to auscultation bilaterally  Heart: regular rate and rhythm,   Abdomen: soft, non-tender. Bowel sounds normal. No masses,  Extremities: No LE edema. able to move all extremities normal, atraumatic  Skin: Normal.  Neurologic: Omitted as patient is unable to cooperate. Psychiatric: Omitted as patient is unable to cooperate. Labs/Imaging/Diagnostics    Labs:  CBC:  Recent Labs     02/25/23 2133   WBC 8.5   RBC 4.80   HGB 14.4   HCT 44.9   MCV 93.5   RDW 16.0*        CHEMISTRIES:  Recent Labs     02/26/23  0151 02/25/23 2133   * 148*   K 2.8* 3.1*   * 109*   CO2 27 32   BUN 4* 5*   CA 7.6* 9.1   PT/INR:No results for input(s): INR, INREXT in the last 72 hours. No lab exists for component: PROTIME  APTT:No results for input(s): APTT in the last 72 hours. LIVER PROFILE:  Recent Labs     02/25/23 2133   AST 44*   ALT 22     Lab Results   Component Value Date/Time    ALT (SGPT) 22 02/25/2023 09:33 PM    AST (SGOT) 44 (H) 02/25/2023 09:33 PM    Alk. phosphatase 75 02/25/2023 09:33 PM    Bilirubin, total 0.7 02/25/2023 09:33 PM       Imaging Last 24 Hours:  CT ABD PELV W CONT    Result Date: 2/25/2023  EXAM: CT ABD PELV W CONT INDICATION: eval persistent vomiting COMPARISON: None CONTRAST: 100 mL of Isovue-370. ORAL CONTRAST: None TECHNIQUE: Following the uneventful intravenous administration of contrast, thin axial images were obtained through the abdomen and pelvis. Coronal and sagittal reconstructions were generated. CT dose reduction was achieved through use of a standardized protocol tailored for this examination and automatic exposure control for dose modulation. FINDINGS: LOWER THORAX: No significant abnormality in the incidentally imaged lower chest. LIVER: No mass. BILIARY TREE: Gallbladder is within normal limits.  CBD is not dilated. SPLEEN: within normal limits. PANCREAS: No mass or ductal dilatation. ADRENALS: Unremarkable. KIDNEYS: No mass, calculus, or hydronephrosis. STOMACH: Unremarkable. SMALL BOWEL: No dilatation or wall thickening. COLON: No dilatation or wall thickening. APPENDIX: Normal PERITONEUM: No ascites or pneumoperitoneum. RETROPERITONEUM: No lymphadenopathy or aortic aneurysm. REPRODUCTIVE ORGANS: Large solid and cystic mass measuring 10.0 x 6.9 cm arising from the uterine myometrium, compressing the endometrial cavity. The uterus is enlarged. URINARY BLADDER: No mass or calculus. BONES: No destructive bone lesion. ABDOMINAL WALL: No mass or hernia. ADDITIONAL COMMENTS: N/A     No bowel obstruction. Large heterogeneous intrauterine mass measuring 10.0 x 6.9 cm concerning for uterine malignancy. XR CHEST PORT    Result Date: 2/25/2023  INDICATION: eval for pneumonia EXAM:  AP CHEST RADIOGRAPH COMPARISON: February 20, 2023 FINDINGS: AP portable view of the chest demonstrates a normal cardiomediastinal silhouette. There is no edema, effusion, consolidation, or pneumothorax. The osseous structures are unremarkable. No acute process. Assessment//Plan   Active Problems:    Intractable nausea and vomiting (2/26/2023)    CT Results  (Last 48 hours)                 02/25/23 2226  CT ABD PELV W CONT Final result    Impression:  No bowel obstruction. Large heterogeneous intrauterine mass measuring 10.0 x 6.9 cm concerning for   uterine malignancy. Narrative:  EXAM: CT ABD PELV W CONT       INDICATION: eval persistent vomiting       COMPARISON: None        CONTRAST: 100 mL of Isovue-370. ORAL CONTRAST: None       TECHNIQUE:    Following the uneventful intravenous administration of contrast, thin axial   images were obtained through the abdomen and pelvis. Coronal and sagittal   reconstructions were generated.  CT dose reduction was achieved through use of a   standardized protocol tailored for this examination and automatic exposure   control for dose modulation. FINDINGS:    LOWER THORAX: No significant abnormality in the incidentally imaged lower chest.   LIVER: No mass. BILIARY TREE: Gallbladder is within normal limits. CBD is not dilated. SPLEEN: within normal limits. PANCREAS: No mass or ductal dilatation. ADRENALS: Unremarkable. KIDNEYS: No mass, calculus, or hydronephrosis. STOMACH: Unremarkable. SMALL BOWEL: No dilatation or wall thickening. COLON: No dilatation or wall thickening. APPENDIX: Normal   PERITONEUM: No ascites or pneumoperitoneum. RETROPERITONEUM: No lymphadenopathy or aortic aneurysm. REPRODUCTIVE ORGANS: Large solid and cystic mass measuring 10.0 x 6.9 cm arising   from the uterine myometrium, compressing the endometrial cavity. The uterus is   enlarged. URINARY BLADDER: No mass or calculus. BONES: No destructive bone lesion. ABDOMINAL WALL: No mass or hernia. ADDITIONAL COMMENTS: N/A                   Assessment & Plan  2/25/23 admission course  Alisson Carreno is a 56 y.o. female with PMH of cerebral palsy, cognitive disability, chronic vomiting, hypertension and seizure. Limited history due to underlying disability. She was brought to the ED from facility for evaluation of persistent vomiting and decreased oral intake. Patient has a history of vomiting and is regularly dehydrated due to decreased p.o. intake, scheduled for a PEG placement by Dr. Arcelia Orr next week. In the ED, vitals stable. No leukocytosis. UA indicative of UTI. Also hyponatremia and hypokalemia. Lactic acid normalized with resuscitation. COVID-positive. 2/25/23 CT abdo pelvis IMPRESSION  No bowel obstruction. Large heterogeneous intrauterine mass measuring 10.0 x 6.9 cm concerning for  uterine malignancy.     2/26/23 no new complaints, tolerating medications well    MICROBIOLOGY    2/15/23 Urine  Escherichia coli  2/20/23 Blood  Negative    2/25/23 Blood  Pending    2/25/23 Covid 19 Positive  2/25/23 Influenza AB Negative    2/26/23 Urine  Pending    ASSESSMENT AND PLAN    1) Dehydration, intractable nausea and vomiting. Supportive care   Intravenous fluids   Anti emetics   Correction of electrolytes   EGD anticipated with Dr Arcelia Orr    2) Covid 19 coronaviral syndrome     Comfortable on room air at admission   Supportive care with respiratory support as needed   Corticosteroids   Bronchodilators   Ceftriaxone    3) Urinary infection at admission     Ceftriaxone and fluconazole for now pending further culture data    4) History of seizures in a patient with cerebral palsy.      IV phenytoin for now pending enteric access   Once established resume home regimen    5) DVT prophylaxis with enoxaparin      Electronically signed by Fabby Shin MD on 2/26/2023 at 2:42 PM

## 2023-02-26 NOTE — H&P
History and Physical    Patient: Joelle Kenney MRN: [id number]  SSN: [id number]    YOB: 1975  Age: 56 y.o. Sex: female      Subjective:      Joelle Kenney is a 56 y.o. female with PMH of cerebral palsy, cognitive disability, chronic vomiting, hypertension and seizure. Limited history due to underlying disability. She was brought to the ED from facility for evaluation of persistent vomiting and decreased oral intake. Patient has a history of vomiting and is regularly dehydrated due to decreased p.o. intake, scheduled for a PEG placement by Dr. Deb Haley next week. In the ED, vitals stable. No leukocytosis. UA indicative of UTI. Also hyponatremia and hypokalemia. Lactic acid normalized with resuscitation. COVID-positive. Chart review: none     Past Medical History:   Diagnosis Date    Cerebral palsy ([address]     HTN (hypertension)     Microcephalic ([address]     Seizure ([facility]      No family history on file. Social History     Tobacco Use    Smoking status: Not on file    Smokeless tobacco: Not on file   Substance Use Topics    Alcohol use: Not on file        Objective:     Physical Exam:   General: alart but not cooperative, no distress  Eye: conjunctivae/corneas clear. PERRL, EOM's intact. Throat and Neck: Omitted as patient is unable to cooperate. Lung: clear to auscultation bilaterally  Heart: regular rate and rhythm,   Abdomen: soft, non-tender. Bowel sounds normal. No masses,  Extremities: No LE edema. able to move all extremities normal, atraumatic  Skin: Normal.  Neurologic: Omitted as patient is unable to cooperate. Psychiatric: Omitted as patient is unable to cooperate. Most recent lab work and imaging results reviewed in [address] [place] [address]. Assessment and plan:   # Intractable nausea and vomiting  - IV Zofran and Reglan as needed  - Consult Dr. Deb Haley, possible earlier PEG placement. # Hypernatremia  - Worsening with NS boluses in ED.    - Will start LR at 125cc/hr  - Monitor Na q6h.     # Hypokalemia  - Potassium replacement ordered  - LR infusion as above. - Recheck in AM.     # Dehydration  - Fluid resuscitation as above. # UTI   - IV fluconazole empirically  - urine culture. # COVID-19  - IV prednisone  - Baricitinib and Paxlovid not indicated  - Monitor oxygen saturations. - Droplet plus isolation    # Seizure  - Resume IV phenytoin. Monitor phenytoin level  - Seizure precaution    # Social Determents of health: Low health literacy   - communication with nursing facility on discharge    # Full code by default, need further clarification    # Medication reconciliation: Medication list reviewed on Epic and/or outside documentation. Not reviewed with patient. However, medication reconciliation incomplete, appreciate assistance from pharmacy or nursing staff.     Signed By: Roberto Mancuso MD     February 26, 2023

## 2023-02-27 ENCOUNTER — ANESTHESIA EVENT (OUTPATIENT)
Dept: ENDOSCOPY | Age: 48
DRG: 871 | End: 2023-02-27
Payer: MEDICARE

## 2023-02-27 ENCOUNTER — ANESTHESIA (OUTPATIENT)
Dept: ENDOSCOPY | Age: 48
DRG: 871 | End: 2023-02-27
Payer: MEDICARE

## 2023-02-27 LAB
ANION GAP SERPL CALC-SCNC: 10 MMOL/L (ref 5–15)
BUN SERPL-MCNC: 8 MG/DL (ref 6–20)
BUN/CREAT SERPL: 16 (ref 12–20)
CA-I BLD-MCNC: 8.4 MG/DL (ref 8.5–10.1)
CHLORIDE SERPL-SCNC: 109 MMOL/L (ref 97–108)
CHOLEST SERPL-MCNC: 126 MG/DL
CO2 SERPL-SCNC: 28 MMOL/L (ref 21–32)
CREAT SERPL-MCNC: 0.51 MG/DL (ref 0.55–1.02)
ERYTHROCYTE [DISTWIDTH] IN BLOOD BY AUTOMATED COUNT: 15.7 % (ref 11.5–14.5)
ERYTHROCYTE [DISTWIDTH] IN BLOOD BY AUTOMATED COUNT: 16.1 % (ref 11.5–14.5)
EST. AVERAGE GLUCOSE BLD GHB EST-MCNC: 103 MG/DL
GLUCOSE SERPL-MCNC: 94 MG/DL (ref 65–100)
HBA1C MFR BLD: 5.2 % (ref 4–5.6)
HCT VFR BLD AUTO: 34.6 % (ref 35–47)
HCT VFR BLD AUTO: 39.6 % (ref 35–47)
HDLC SERPL-MCNC: 54 MG/DL
HDLC SERPL: 2.3 (ref 0–5)
HGB BLD-MCNC: 11 G/DL (ref 11.5–16)
HGB BLD-MCNC: 12.6 G/DL (ref 11.5–16)
LAMOTRIGINE SERPL-MCNC: 7.7 UG/ML (ref 2–20)
LDLC SERPL CALC-MCNC: 50.2 MG/DL (ref 0–100)
LIPID PROFILE,FLP: NORMAL
MCH RBC QN AUTO: 29.7 PG (ref 26–34)
MCH RBC QN AUTO: 30.4 PG (ref 26–34)
MCHC RBC AUTO-ENTMCNC: 31.8 G/DL (ref 30–36.5)
MCHC RBC AUTO-ENTMCNC: 31.8 G/DL (ref 30–36.5)
MCV RBC AUTO: 93.5 FL (ref 80–99)
MCV RBC AUTO: 95.7 FL (ref 80–99)
NRBC # BLD: 0 K/UL (ref 0–0.01)
NRBC # BLD: 0 K/UL (ref 0–0.01)
NRBC BLD-RTO: 0 PER 100 WBC
NRBC BLD-RTO: 0 PER 100 WBC
PLATELET # BLD AUTO: 186 K/UL (ref 150–400)
PLATELET # BLD AUTO: 195 K/UL (ref 150–400)
PMV BLD AUTO: 10.8 FL (ref 8.9–12.9)
PMV BLD AUTO: 11.2 FL (ref 8.9–12.9)
POTASSIUM SERPL-SCNC: 2.9 MMOL/L (ref 3.5–5.1)
RBC # BLD AUTO: 3.7 M/UL (ref 3.8–5.2)
RBC # BLD AUTO: 4.14 M/UL (ref 3.8–5.2)
SODIUM SERPL-SCNC: 147 MMOL/L (ref 136–145)
TRIGL SERPL-MCNC: 109 MG/DL (ref ?–150)
VLDLC SERPL CALC-MCNC: 21.8 MG/DL
WBC # BLD AUTO: 7.6 K/UL (ref 3.6–11)
WBC # BLD AUTO: 8.3 K/UL (ref 3.6–11)

## 2023-02-27 PROCEDURE — 77030012058: Performed by: INTERNAL MEDICINE

## 2023-02-27 PROCEDURE — 36573 INSJ PICC RS&I 5 YR+: CPT | Performed by: HOSPITALIST

## 2023-02-27 PROCEDURE — 2709999900 HC NON-CHARGEABLE SUPPLY: Performed by: INTERNAL MEDICINE

## 2023-02-27 PROCEDURE — 74011000250 HC RX REV CODE- 250: Performed by: INTERNAL MEDICINE

## 2023-02-27 PROCEDURE — 80048 BASIC METABOLIC PNL TOTAL CA: CPT

## 2023-02-27 PROCEDURE — 76060000031 HC ANESTHESIA FIRST 0.5 HR: Performed by: INTERNAL MEDICINE

## 2023-02-27 PROCEDURE — 74011000258 HC RX REV CODE- 258: Performed by: HOSPITALIST

## 2023-02-27 PROCEDURE — 99222 1ST HOSP IP/OBS MODERATE 55: CPT | Performed by: OBSTETRICS & GYNECOLOGY

## 2023-02-27 PROCEDURE — 36415 COLL VENOUS BLD VENIPUNCTURE: CPT

## 2023-02-27 PROCEDURE — 83036 HEMOGLOBIN GLYCOSYLATED A1C: CPT

## 2023-02-27 PROCEDURE — 80061 LIPID PANEL: CPT

## 2023-02-27 PROCEDURE — 74011000250 HC RX REV CODE- 250: Performed by: NURSE ANESTHETIST, CERTIFIED REGISTERED

## 2023-02-27 PROCEDURE — 76040000019: Performed by: INTERNAL MEDICINE

## 2023-02-27 PROCEDURE — 74011250636 HC RX REV CODE- 250/636: Performed by: NURSE ANESTHETIST, CERTIFIED REGISTERED

## 2023-02-27 PROCEDURE — 85027 COMPLETE CBC AUTOMATED: CPT

## 2023-02-27 PROCEDURE — 77030014285 HC CATH GASTMY PEG KT BSC -C: Performed by: INTERNAL MEDICINE

## 2023-02-27 PROCEDURE — 0DH68UZ INSERTION OF FEEDING DEVICE INTO STOMACH, VIA NATURAL OR ARTIFICIAL OPENING ENDOSCOPIC: ICD-10-PCS | Performed by: INTERNAL MEDICINE

## 2023-02-27 PROCEDURE — 65270000029 HC RM PRIVATE

## 2023-02-27 PROCEDURE — 74011250637 HC RX REV CODE- 250/637: Performed by: INTERNAL MEDICINE

## 2023-02-27 PROCEDURE — 74011250636 HC RX REV CODE- 250/636: Performed by: HOSPITALIST

## 2023-02-27 PROCEDURE — 36569 INSJ PICC 5 YR+ W/O IMAGING: CPT

## 2023-02-27 PROCEDURE — 74011250636 HC RX REV CODE- 250/636: Performed by: INTERNAL MEDICINE

## 2023-02-27 PROCEDURE — 05HY33Z INSERTION OF INFUSION DEVICE INTO UPPER VEIN, PERCUTANEOUS APPROACH: ICD-10-PCS

## 2023-02-27 PROCEDURE — 93005 ELECTROCARDIOGRAM TRACING: CPT

## 2023-02-27 RX ORDER — LIDOCAINE HYDROCHLORIDE 20 MG/ML
INJECTION, SOLUTION EPIDURAL; INFILTRATION; INTRACAUDAL; PERINEURAL AS NEEDED
Status: DISCONTINUED | OUTPATIENT
Start: 2023-02-27 | End: 2023-02-27 | Stop reason: HOSPADM

## 2023-02-27 RX ORDER — PANTOPRAZOLE SODIUM 40 MG/1
40 TABLET, DELAYED RELEASE ORAL
Status: DISCONTINUED | OUTPATIENT
Start: 2023-02-28 | End: 2023-03-03 | Stop reason: HOSPADM

## 2023-02-27 RX ORDER — PROPOFOL 10 MG/ML
INJECTION, EMULSION INTRAVENOUS AS NEEDED
Status: DISCONTINUED | OUTPATIENT
Start: 2023-02-27 | End: 2023-02-27 | Stop reason: HOSPADM

## 2023-02-27 RX ORDER — SODIUM CHLORIDE 9 MG/ML
INJECTION, SOLUTION INTRAVENOUS
Status: DISCONTINUED | OUTPATIENT
Start: 2023-02-27 | End: 2023-02-27 | Stop reason: HOSPADM

## 2023-02-27 RX ADMIN — SODIUM CHLORIDE, PRESERVATIVE FREE 10 ML: 5 INJECTION INTRAVENOUS at 21:30

## 2023-02-27 RX ADMIN — PROPOFOL 100 MG: 10 INJECTION, EMULSION INTRAVENOUS at 13:25

## 2023-02-27 RX ADMIN — FLUCONAZOLE, SODIUM CHLORIDE 400 MG: 2 INJECTION INTRAVENOUS at 01:13

## 2023-02-27 RX ADMIN — PROPOFOL 100 MG: 10 INJECTION, EMULSION INTRAVENOUS at 13:36

## 2023-02-27 RX ADMIN — LIDOCAINE HYDROCHLORIDE 40 MG: 20 INJECTION, SOLUTION EPIDURAL; INFILTRATION; INTRACAUDAL; PERINEURAL at 13:25

## 2023-02-27 RX ADMIN — CEFTRIAXONE 2 G: 2 INJECTION, POWDER, FOR SOLUTION INTRAMUSCULAR; INTRAVENOUS at 16:51

## 2023-02-27 RX ADMIN — SODIUM CHLORIDE: 9 INJECTION, SOLUTION INTRAVENOUS at 13:05

## 2023-02-27 RX ADMIN — SODIUM CHLORIDE 100 MG PE: 9 INJECTION, SOLUTION INTRAVENOUS at 21:29

## 2023-02-27 RX ADMIN — DEXAMETHASONE SODIUM PHOSPHATE 6 MG: 10 INJECTION INTRAMUSCULAR; INTRAVENOUS at 10:19

## 2023-02-27 RX ADMIN — SODIUM CHLORIDE, PRESERVATIVE FREE 100 MG PE: 5 INJECTION INTRAVENOUS at 10:19

## 2023-02-27 RX ADMIN — SODIUM CHLORIDE, PRESERVATIVE FREE 10 ML: 5 INJECTION INTRAVENOUS at 16:51

## 2023-02-27 NOTE — PROGRESS NOTES
[facility] Hospitalist Progress Note      Date:2023       Room:ENDO/PL  Patient Ana Chacon     YOB: 1975     Age:47 y.o. Tyler Mercado is a 56 y.o. female with PMH of cerebral palsy, cognitive disability, chronic vomiting, hypertension and seizure. Limited history due to underlying disability. She was brought to the ED from facility for evaluation of persistent vomiting and decreased oral intake. HOSPITAL COURSE:   Patient has a history of vomiting and is regularly dehydrated due to decreased p.o. intake, scheduled for a PEG placement by Dr. Quinn Connelly next week. In the ED, vitals stable. No leukocytosis. UA indicative of UTI. Also hyponatremia and hypokalemia. Lactic acid normalized with resuscitation. COVID-positive. CT abdomen No bowel obstruction. Large heterogeneous intrauterine mass measuring 10.0 x 6.9 cm concerning for uterine malignancy. she was planned for EGD by GI. Subjective    Subjective:  Symptoms:  Stable. Review of Systems   All other systems reviewed and are negative. Objective         Vitals Last 24 Hours:  TEMPERATURE:  Temp  Av.6 °F (37 °C)  Min: 97.6 °F (36.4 °C)  Max: 99.6 °F (37.6 °C)  RESPIRATIONS RANGE: Resp  Av.9  Min: 16  Max: 23  PULSE OXIMETRY RANGE: SpO2  Av.6 %  Min: 94 %  Max: 100 %  PULSE RANGE: Pulse  Av.6  Min: 67  Max: 85  BLOOD PRESSURE RANGE: Systolic (22EUQ), NSM:289 , Min:94 , CNH:750   ; Diastolic (55MLV), DRQ:93, Min:69, Max:75    I/O (24Hr): Intake/Output Summary (Last 24 hours) at 2023 1356  Last data filed at 2023 1339  Gross per 24 hour   Intake 200 ml   Output --   Net 200 ml       Objective:  General Appearance:  Comfortable. Vital signs: (most recent): Blood pressure 105/69, pulse 70, temperature 97.6 °F (36.4 °C), resp. rate 21, height 4' 11\" (1.499 m), weight 68 kg (150 lb), SpO2 98 %. General: alart but not cooperative, no distress  Eye: conjunctivae/corneas clear.  PERRL, EOM's intact. Throat and Neck: Omitted as patient is unable to cooperate. Lung: clear to auscultation bilaterally  Heart: regular rate and rhythm,   Abdomen: soft, non-tender. Bowel sounds normal. No masses,  Extremities: No LE edema. able to move all extremities normal, atraumatic  Skin: Normal.  Neurologic: Omitted as patient is unable to cooperate. Psychiatric: Omitted as patient is unable to cooperate. Labs/Imaging/Diagnostics    Labs:  CBC:  Recent Labs     02/27/23  1208 02/25/23 2133   WBC 7.6 8.5   RBC 3.70* 4.80   HGB 11.0* 14.4   HCT 34.6* 44.9   MCV 93.5 93.5   RDW 15.7* 16.0*    181       CHEMISTRIES:  Recent Labs     02/27/23  1208 02/26/23  0151 02/25/23 2133   * 151* 148*   K 2.9* 2.8* 3.1*   * 115* 109*   CO2 28 27 32   BUN 8 4* 5*   CA 8.4* 7.6* 9.1     PT/INR:No results for input(s): INR, INREXT, INREXT in the last 72 hours. No lab exists for component: PROTIME  APTT:No results for input(s): APTT in the last 72 hours. LIVER PROFILE:  Recent Labs     02/25/23 2133   AST 44*   ALT 22       Lab Results   Component Value Date/Time    ALT (SGPT) 22 02/25/2023 09:33 PM    AST (SGOT) 44 (H) 02/25/2023 09:33 PM    Alk. phosphatase 75 02/25/2023 09:33 PM    Bilirubin, total 0.7 02/25/2023 09:33 PM       Imaging Last 24 Hours:  No results found. Assessment//Plan   Active Problems:    Intractable nausea and vomiting (2/26/2023)  CT Results  (Last 48 hours)                 02/25/23 2226  CT ABD PELV W CONT Final result    Impression:  No bowel obstruction. Large heterogeneous intrauterine mass measuring 10.0 x 6.9 cm concerning for   uterine malignancy. Narrative:  EXAM: CT ABD PELV W CONT       INDICATION: eval persistent vomiting       COMPARISON: None        CONTRAST: 100 mL of Isovue-370. ORAL CONTRAST: None       TECHNIQUE:    Following the uneventful intravenous administration of contrast, thin axial   images were obtained through the abdomen and pelvis. Coronal and sagittal   reconstructions were generated. CT dose reduction was achieved through use of a   standardized protocol tailored for this examination and automatic exposure   control for dose modulation. FINDINGS:    LOWER THORAX: No significant abnormality in the incidentally imaged lower chest.   LIVER: No mass. BILIARY TREE: Gallbladder is within normal limits. CBD is not dilated. SPLEEN: within normal limits. PANCREAS: No mass or ductal dilatation. ADRENALS: Unremarkable. KIDNEYS: No mass, calculus, or hydronephrosis. STOMACH: Unremarkable. SMALL BOWEL: No dilatation or wall thickening. COLON: No dilatation or wall thickening. APPENDIX: Normal   PERITONEUM: No ascites or pneumoperitoneum. RETROPERITONEUM: No lymphadenopathy or aortic aneurysm. REPRODUCTIVE ORGANS: Large solid and cystic mass measuring 10.0 x 6.9 cm arising   from the uterine myometrium, compressing the endometrial cavity. The uterus is   enlarged. URINARY BLADDER: No mass or calculus. BONES: No destructive bone lesion. ABDOMINAL WALL: No mass or hernia. ADDITIONAL COMMENTS: N/A                   Assessment & Plan      MICROBIOLOGY    2/15/23 Urine  Escherichia coli  2/20/23 Blood  Negative    2/25/23 Blood  Pending    2/25/23 Covid 19 Positive  2/25/23 Influenza AB Negative    2/26/23 Urine  Pending                  ASSESSMENT AND PLAN  (1) uterine mass concerning for malignancy: consult OBGYN    (1) acute gastritis: continue IVF, antiemetics. Protonix. Consult GI.  Possible EGD on 2/27    (2) UTI: ceftriaxone and fluconazole until Ucx finalized    (3) Covid 19 coronaviral syndrome: dexamethasone,      Comfortable on room air at admission   Supportive care with respiratory support as needed   Corticosteroids   Bronchodilators       (4) History of seizures in a patient with cerebral palsy: IV phenytoin for now pending enteric access: once established resume home regimen    (5) GERD: protonix      DISPO: group home when seen by OBGYN and cleared by GI.  To group home          Electronically signed by Staci Denis MD on 2/27/2023 at 2:42 PM

## 2023-02-27 NOTE — PROGRESS NOTES
Problem: Airway Clearance - Ineffective  Goal: Achieve or maintain patent airway  Outcome: Progressing Towards Goal     Problem: Gas Exchange - Impaired  Goal: Absence of hypoxia  Outcome: Progressing Towards Goal  Goal: Promote optimal lung function  Outcome: Progressing Towards Goal     Problem: Breathing Pattern - Ineffective  Goal: Ability to achieve and maintain a regular respiratory rate  Outcome: Progressing Towards Goal     Problem:  Body Temperature -  Risk of, Imbalanced  Goal: Ability to maintain a body temperature within defined limits  Outcome: Progressing Towards Goal  Goal: Will regain or maintain usual level of consciousness  Outcome: Progressing Towards Goal  Goal: Complications related to the disease process, condition or treatment will be avoided or minimized  Outcome: Progressing Towards Goal     Problem: Isolation Precautions - Risk of Spread of Infection  Goal: Prevent transmission of infectious organism to others  Outcome: Progressing Towards Goal     Problem: Nutrition Deficits  Goal: Optimize nutrtional status  Outcome: Progressing Towards Goal     Problem: Risk for Fluid Volume Deficit  Goal: Maintain normal heart rhythm  Outcome: Progressing Towards Goal  Goal: Maintain absence of muscle cramping  Outcome: Progressing Towards Goal  Goal: Maintain normal serum potassium, sodium, calcium, phosphorus, and pH  Outcome: Progressing Towards Goal     Problem: Loneliness or Risk for Loneliness  Goal: Demonstrate positive use of time alone when socialization is not possible  Outcome: Progressing Towards Goal

## 2023-02-27 NOTE — CONSULTS
Gynecology History and Physical    Name: Dagoberto Marquez MRN: [id number] [id number]: [id number]    YOB: 1975  Age: 56 y.o. Sex: female       Subjective:      Chief complaint:  ABDOMINAL MASS    Jorge Melo is a 56 y.o.  female with a history of cerebral palsy, cognitive disability, chronic vomiting, hypertension and seizure. She is admitted for Procedure(s) (LRB):  ESOPHAGOGASTRODUODENOSCOPY (EGD) (N/A)  PERCUTANEOUS ENDOSCOPIC GASTROSTOMY TUBE INSERTION (N/A). During abd/pelvis CT scan incidental findings of pelvic mass was discovered and GYN consult was placed    OB History    No obstetric history on file. Past Medical History:   Diagnosis Date    Cerebral palsy ([facility] [address]     HTN (hypertension)     Microcephalic ([facility] [address]     Seizure ([facility] [address]      No past surgical history on file. Social History     Occupational History    Not on file   Tobacco Use    Smoking status: Not on file    Smokeless tobacco: Not on file   Substance and Sexual Activity    Alcohol use: Not on file    Drug use: Not on file    Sexual activity: Not on file     No family history on file. No Known Allergies  Prior to Admission medications    Medication Sig Start Date End Date Taking? Authorizing Provider   phenytoin ER (DILANTIN ER) 30 mg ER capsule Take 30 mg by mouth daily. Indications: epilepsy   Yes Other, MD Dominic   lamoTRIgine (LaMICtal) 200 mg tablet Take 200 mg by mouth daily. Yes Other, MD Dominic   metoprolol tartrate (LOPRESSOR) 25 mg tablet Take 25 mg by mouth two (2) times a day. Yes Chavo, MD Dominic   mirtazapine (REMERON) 7.5 mg tablet Take 7.5 mg by mouth nightly. Yes Chavo, MD Dominic   phenytoin ER (DILANTIN ER) 100 mg ER capsule Take 200 mg by mouth daily. Yes Chavo, MD Dominic        Review of Systems:  A comprehensive review of systems was negative except for that written in the History of Present Illness.      Objective:     Vitals:    02/27/23 1350 02/27/23 1355 02/27/23 1400 02/27/23 1407   BP: 94/69 105/69 111/72 117/74   Pulse:  70 69 69   Resp:  21 20 20   Temp:       SpO2:  98% 98% 98%   Weight:       Height:           Physical Exam:  Abdomen: soft, nontender  External Genitalia: normal general appearance  Urinary system: urethral meatus normal  Vagina: normal mucosa without prolapse or lesions  Cervix: normal appearance  Adnexa: non palpable  Uterus: irregular enlargement    Assessment:     Active Problems:    Intractable nausea and vomiting (2/26/2023)     55 yo with cerebral palsy admitted for not being able to eat  Incidental findings of pelvic mass    CT scan  Large solid and cystic mass measuring 10.0 x 6.9 cm arising   from the uterine myometrium, compressing the endometrial cavity. The uterus is   enlarged.        Plan:     Large solid and cystic uterine mass certainly rises susp of uterine cancer  Although absence of parametrial lymph nodes or metastasis could be seen in uterus with necrotic fibroids   TVUS ordered  to better visualize uterus and adnexa  Recommend consulting Dr Merry Robledo

## 2023-02-27 NOTE — ANESTHESIA PREPROCEDURE EVALUATION
Relevant Problems   No relevant active problems       Anesthetic History     PONV          Review of Systems / Medical History  Patient summary reviewed, nursing notes reviewed and pertinent labs reviewed    Pulmonary  Within defined limits                 Neuro/Psych     seizures        Comments: Seizure ([address]  Cerebral palsy ([address]  Microcephalic ([address] Cardiovascular    Hypertension                   GI/Hepatic/Renal               Comments: Chronic Vomiting  Not eating or drinking - Failure to Thrive Endo/Other        Obesity     Other Findings   Comments:   Failure to Thrive  Covid 19+  - No Current Pulmonary Symptoms      Procedure Information    Case: [id number] Anesthesia Start Date/Time: 02/27/23 1311  Procedures:        ESOPHAGOGASTRODUODENOSCOPY (EGD) (Upper GI Region)       PERCUTANEOUS ENDOSCOPIC GASTROSTOMY TUBE INSERTION (Upper GI Region)  Anesthesia type: MAC  Diagnosis: Dysphagia, unspecified type (R13.10)  Pre-op diagnosis: Dysphagia, unspecified type (R13.10)  Location: [facility] ENDO 02 / [facility] ENDOSCOPY  Providers: Devang Patel MD      Medical History  Seizure ([address]  HTN (hypertension)  Cerebral palsy ([address]  Microcephalic ([address]           Physical Exam    Airway  Mallampati: III  TM Distance: 4 - 6 cm  Neck ROM: normal range of motion   Mouth opening: Normal     Cardiovascular    Rhythm: regular  Rate: normal         Dental    Dentition: Poor dentition     Pulmonary  Breath sounds clear to auscultation               Abdominal  GI exam deferred       Other Findings            Anesthetic Plan    ASA: 4  Anesthesia type: total IV anesthesia and MAC    Monitoring Plan: Continuous noninvasive hemodynamic monitoring      Induction: Intravenous  Anesthetic plan and risks discussed with: Patient and Family

## 2023-02-27 NOTE — PROGRESS NOTES
Vascular Access Team Consult - Chart Review Note: Patient assessed for PICC placement. Patient is candidate.     Recent Labs     02/26/23  0151 02/25/23  2133   BUN 4* 5*   CREA 0.37* 0.64   HGB  --  14.4   HCT  --  44.9   PLT  --  181   WBC  --  8.5             Hasmukh Almaraz RN

## 2023-02-27 NOTE — ANESTHESIA POSTPROCEDURE EVALUATION
Procedure(s):  ESOPHAGOGASTRODUODENOSCOPY (EGD)  PERCUTANEOUS ENDOSCOPIC GASTROSTOMY TUBE INSERTION.     total IV anesthesia, MAC    Anesthesia Post Evaluation      Multimodal analgesia: multimodal analgesia not used between 6 hours prior to anesthesia start to PACU discharge  Patient location during evaluation: bedside  Patient participation: complete - patient cannot participate  Level of consciousness: responsive to physical stimuli  Pain score: 0  Pain management: adequate  Airway patency: patent  Anesthetic complications: no  Cardiovascular status: acceptable  Respiratory status: acceptable  Hydration status: acceptable  Post anesthesia nausea and vomiting:  none      INITIAL Post-op Vital signs:   Vitals Value Taken Time   BP 94/69 02/27/23 1339   Temp     Pulse 79 02/27/23 1339   Resp 23 02/27/23 1339   SpO2 98 % 02/27/23 1339

## 2023-02-27 NOTE — CONSULTS
Consult    Patient: Joelle Kenney MRN: [id number]  SSN: [id number]    YOB: 1975  Age: 56 y.o. Sex: female      Subjective:      Joelle Kenney is a 56 y.o. female who is being seen for dysphagia, weight loss,. Is from medical records,  Patient was in my office, couple days ago, with similar episode, had difficult feeding last couple weeks,  Has nausea, weight loss, the plan was to place a feeding tube,  However patient had increase of nausea, not able to tolerate any liquid diet, not able to take medication, brought to the emergency room by the caregiver, was made over last night. Emergency room test showed patient have a COVID infection, blood test showed patient had a hyper leukemia, hypokalemia, an  CT showed theno GI tract obstruction, there was a mass in the uterus with possibility about the malignancy,  Currently patient was on IV fluid hydration, potassium replacement,  Past Medical History:   Diagnosis Date    C[facility]bral palsy ([facility] [address]     HTN (hypertension)     Microcephalic ([facility] [address]     Seizure ([facility] [address]      No past surgical history on file. No family history on file.   Social History     Tobacco Use    Smoking status: Not on file    Smokeless tobacco: Not on file   Substance Use Topics    Alcohol use: Not on file      Current Facility-Administered Medications   Medication Dose Route Frequency Provider Last Rate Last Admin    dexamethasone (PF) (DECADRON) 10 mg/mL injection 6 mg  6 mg Oral DAILY Dimitri Larkin MD   6 mg at 02/26/23 1003    sodium chloride (NS) flush 5-40 mL  5-40 mL IntraVENous Q8H Dimitri Larkin MD   10 mL at 02/26/23 1319    sodium chloride (NS) flush 5-40 mL  5-40 mL IntraVENous PRN Dimitri Larkin MD        acetaminophen (TYLENOL) tablet 650 mg  650 mg Oral Q6H PRN Dimitri Larkin MD        Or    acetaminophen (TYLENOL) suppository 650 mg  650 mg Rectal Q6H PRN Dimitri Larkin MD        polyethylene glycol (MIRALAX) packet 17 g  17 g Oral DAILY PRN Caro Head Benjamín Rogers MD        ondansetron (ZOFRAN ODT) tablet 4 mg  4 mg Oral Q8H PRN Dallas Larkin MD        Or    ondansetron [facility]) injection 4 mg  4 mg IntraVENous Q6H PRN Dallas Larkin MD        enoxaparin (LOVENOX) injection 40 mg  40 mg SubCUTAneous DAILY Dallas Larkin MD   40 mg at 02/26/23 1003    metoclopramide HCl (REGLAN) injection 5 mg  5 mg IntraVENous Q6H PRN Tanvi Rueda MD        fluconazole (DIFLUCAN) 400mg/200 mL IVPB (premix)  400 mg IntraVENous Q24H Tanvi Rueda  mL/hr at 02/26/23 0251 400 mg at 02/26/23 0251    fosphenytoin (CEREBYX) 100 mg PE in 0.9% sodium chloride 4 mL IV syringe  100 mg PE IntraVENous Q12H Tanvi Rueda MD   100 mg PE at 02/26/23 1154    albuterol (PROVENTIL HFA, VENTOLIN HFA, PROAIR HFA) inhaler 2 Puff  2 Puff Inhalation Q4H PRN Michael Irizarry MD        cefTRIAXone (ROCEPHIN) 2 g in sterile water (preservative free) 20 mL IV syringe  2 g IntraVENous Q24H Silvina Thornton MD   2 g at 02/26/23 1539        No Known Allergies    Review of Systems:  Review of Systems   Unable to perform ROS: Medical condition      Objective:     Vitals:    02/26/23 0226 02/26/23 0330 02/26/23 0854 02/26/23 1439   BP: 123/74 107/68 128/87 112/75   Pulse:  94 91 85   Resp:  18 18 16   Temp:  99.7 °F (37.6 °C) 98 °F (36.7 °C) 99.6 °F (37.6 °C)   SpO2: 97% 97% 94% 98%   Weight:       Height:            Physical Exam:  Physical Exam  Constitutional:       Appearance: She is ill-appearing. HENT:      Head: Atraumatic. Mouth/Throat:      Mouth: Mucous membranes are dry. Eyes:      General: No scleral icterus. Cardiovascular:      Rate and Rhythm: Rhythm irregular. Heart sounds: Normal heart sounds. Pulmonary:      Breath sounds: Normal breath sounds. Abdominal:      Palpations: Abdomen is soft. There is no mass. Tenderness: There is no rebound. Hernia: No hernia is present. Musculoskeletal:         General: Deformity present. Cervical back: No tenderness. Skin:     General: Skin is warm. Neurological:      Mental Status: Mental status is at baseline. Recent Results (from the past 24 hour(s))   CBC WITH AUTOMATED DIFF    Collection Time: 02/25/23  9:33 PM   Result Value Ref Range    WBC 8.5 3.6 - 11.0 K/uL    RBC 4.80 3.80 - 5.20 M/uL    HGB 14.4 11.5 - 16.0 g/dL    HCT 44.9 35.0 - 47.0 %    MCV 93.5 80.0 - 99.0 FL    MCH 30.0 26.0 - 34.0 PG    MCHC 32.1 30.0 - 36.5 g/dL    RDW 16.0 (H) 11.5 - 14.5 %    PLATELET 856 419 - 561 K/uL    MPV 11.5 8.9 - 12.9 FL    NRBC 0.0 0.0  WBC    ABSOLUTE NRBC 0.00 0.00 - 0.01 K/uL    NEUTROPHILS 89 (H) 32 - 75 %    LYMPHOCYTES 6 (L) 12 - 49 %    MONOCYTES 4 (L) 5 - 13 %    EOSINOPHILS 0 0 - 7 %    BASOPHILS 0 0 - 1 %    IMMATURE GRANULOCYTES 1 (H) 0 - 0.5 %    ABS. NEUTROPHILS 7.6 1.8 - 8.0 K/UL    ABS. LYMPHOCYTES 0.5 (L) 0.8 - 3.5 K/UL    ABS. MONOCYTES 0.4 0.0 - 1.0 K/UL    ABS. EOSINOPHILS 0.0 0.0 - 0.4 K/UL    ABS. BASOPHILS 0.0 0.0 - 0.1 K/UL    ABS. IMM. GRANS. 0.1 (H) 0.00 - 0.04 K/UL    DF AUTOMATED     METABOLIC PANEL, COMPREHENSIVE    Collection Time: 02/25/23  9:33 PM   Result Value Ref Range    Sodium 148 (H) 136 - 145 mmol/L    Potassium 3.1 (L) 3.5 - 5.1 mmol/L    Chloride 109 (H) 97 - 108 mmol/L    CO2 32 21 - 32 mmol/L    Anion gap 7 5 - 15 mmol/L    Glucose 90 65 - 100 mg/dL    BUN 5 (L) 6 - 20 mg/dL    Creatinine 0.64 0.55 - 1.02 mg/dL    BUN/Creatinine ratio 8 (L) 12 - 20      eGFR >60 >60 ml/min/1.73m2    Calcium 9.1 8.5 - 10.1 mg/dL    Bilirubin, total 0.7 0.2 - 1.0 mg/dL    AST (SGOT) 44 (H) 15 - 37 U/L    ALT (SGPT) 22 12 - 78 U/L    Alk.  phosphatase 75 45 - 117 U/L    Protein, total 7.6 6.4 - 8.2 g/dL    Albumin 3.0 (L) 3.5 - 5.0 g/dL    Globulin 4.6 (H) 2.0 - 4.0 g/dL    A-G Ratio 0.7 (L) 1.1 - 2.2     URINALYSIS W/ RFLX MICROSCOPIC    Collection Time: 02/25/23  9:48 PM   Result Value Ref Range    Color Portia      Appearance Turbid (A) Clear      Specific gravity 1.025 1.003 - 1.030      pH (UA) 5.0 5.0 - 8.0      Protein 100 (A) Negative mg/dL    Glucose Negative Negative mg/dL    Ketone 80 (A) Negative mg/dL    Bilirubin Negative Negative      Blood Negative Negative      Urobilinogen 2.0 (H) 0.1 - 1.0 EU/dL    Nitrites Negative Negative      Leukocyte Esterase Moderate (A) Negative     URINE MICROSCOPIC    Collection Time: 02/25/23  9:48 PM   Result Value Ref Range    WBC  0 - 4 /hpf    RBC 10-20 0 - 5 /hpf    Epithelial cells Moderate (A) Few /lpf    Bacteria Negative Negative /hpf    Mucus 4+ (A) Negative /lpf    Budding yeast Present (A) Negative     CULTURE, BLOOD, PAIRED    Collection Time: 02/25/23 10:35 PM    Specimen: Blood   Result Value Ref Range    Special Requests: No Special Requests      Culture result: No growth after 20 hours     COVID-19 RAPID TEST    Collection Time: 02/25/23 10:35 PM   Result Value Ref Range    COVID-19 rapid test DETECTED (A) Not Detected     INFLUENZA A & B AG (RAPID TEST)    Collection Time: 02/25/23 10:35 PM   Result Value Ref Range    Influenza A Antigen Negative Negative      Influenza B Antigen Negative Negative     LACTIC ACID    Collection Time: 02/25/23 10:35 PM   Result Value Ref Range    Lactic acid 2.2 (HH) 0.4 - 2.0 mmol/L   LACTIC ACID    Collection Time: 02/26/23  1:51 AM   Result Value Ref Range    Lactic acid 1.0 0.4 - 2.0 mmol/L   METABOLIC PANEL, BASIC    Collection Time: 02/26/23  1:51 AM   Result Value Ref Range    Sodium 151 (H) 136 - 145 mmol/L    Potassium 2.8 (L) 3.5 - 5.1 mmol/L    Chloride 115 (H) 97 - 108 mmol/L    CO2 27 21 - 32 mmol/L    Anion gap 9 5 - 15 mmol/L    Glucose 76 65 - 100 mg/dL    BUN 4 (L) 6 - 20 mg/dL    Creatinine 0.37 (L) 0.55 - 1.02 mg/dL    BUN/Creatinine ratio 11 (L) 12 - 20      eGFR >60 >60 ml/min/1.73m2    Calcium 7.6 (L) 8.5 - 10.1 mg/dL   PHENYTOIN    Collection Time: 02/26/23  1:51 AM   Result Value Ref Range    Phenytoin 1.6 (L) 10 - 20 ug/mL        CT ABD PELV W CONT   Final Result   No bowel obstruction. Large heterogeneous intrauterine mass measuring 10.0 x 6.9 cm concerning for   uterine malignancy. XR CHEST PORT   Final Result   No acute process. Assessment:     Hospital Problems  Never Reviewed            Codes Class Noted POA    Intractable nausea and vomiting ICD-10-CM: R11.2  ICD-9-CM: 536.2  2/26/2023 Unknown       Dehydration, intractable nausea and vomiting.   Etiology unknown, sepsis, no signs of bowel obstruction,             Covid 19 coronaviral syndrome     Comfortable on room air   urinary infection at admission  Uterus fibroids/mass  Plan:                 Intravenous fluids              Anti emetics              Correction of electrolytes, potassium replacement               Corticosteroids              Bronchodilators              Ceftriaxone  Need A  gynecologist evaluation for the uterus mass    If potassium  sodium in the acceptable range, will schedule patient for an EGD with PEG tube placement, indication will be discussed with patient power of  in detail  I have talked with patient's caregiver last week in the office in detail    Signed By: Jenniffer Beltre MD     February 26, 2023         Thank you for allowing me to participate in this patients care  Cc Referring Physician   Leesa Kelly MD

## 2023-02-28 ENCOUNTER — APPOINTMENT (OUTPATIENT)
Dept: ULTRASOUND IMAGING | Age: 48
DRG: 871 | End: 2023-02-28
Attending: OBSTETRICS & GYNECOLOGY
Payer: MEDICARE

## 2023-02-28 LAB
ANION GAP SERPL CALC-SCNC: 11 MMOL/L (ref 5–15)
ATRIAL RATE: 67 BPM
BUN SERPL-MCNC: 9 MG/DL (ref 6–20)
BUN/CREAT SERPL: 17 (ref 12–20)
CA-I BLD-MCNC: 8.4 MG/DL (ref 8.5–10.1)
CALCULATED P AXIS, ECG09: 27 DEGREES
CALCULATED R AXIS, ECG10: 63 DEGREES
CALCULATED T AXIS, ECG11: 37 DEGREES
CHLORIDE SERPL-SCNC: 110 MMOL/L (ref 97–108)
CO2 SERPL-SCNC: 28 MMOL/L (ref 21–32)
CREAT SERPL-MCNC: 0.53 MG/DL (ref 0.55–1.02)
DATE LAST DOSE: NORMAL
DIAGNOSIS, 93000: NORMAL
ERYTHROCYTE [DISTWIDTH] IN BLOOD BY AUTOMATED COUNT: 16.1 % (ref 11.5–14.5)
GLUCOSE SERPL-MCNC: 147 MG/DL (ref 65–100)
HCT VFR BLD AUTO: 35.2 % (ref 35–47)
HGB BLD-MCNC: 11.4 G/DL (ref 11.5–16)
MCH RBC QN AUTO: 30.2 PG (ref 26–34)
MCHC RBC AUTO-ENTMCNC: 32.4 G/DL (ref 30–36.5)
MCV RBC AUTO: 93.4 FL (ref 80–99)
NRBC # BLD: 0.02 K/UL (ref 0–0.01)
NRBC BLD-RTO: 0.3 PER 100 WBC
P-R INTERVAL, ECG05: 126 MS
PHENYTOIN SERPL-MCNC: 10.2 UG/ML (ref 10–20)
PLATELET # BLD AUTO: 205 K/UL (ref 150–400)
PMV BLD AUTO: 10.7 FL (ref 8.9–12.9)
POTASSIUM SERPL-SCNC: 3 MMOL/L (ref 3.5–5.1)
Q-T INTERVAL, ECG07: 350 MS
QRS DURATION, ECG06: 62 MS
QTC CALCULATION (BEZET), ECG08: 369 MS
RBC # BLD AUTO: 3.77 M/UL (ref 3.8–5.2)
REPORTED DOSE,DOSE: NORMAL UNITS
SODIUM SERPL-SCNC: 149 MMOL/L (ref 136–145)
VENTRICULAR RATE, ECG03: 67 BPM
WBC # BLD AUTO: 5.9 K/UL (ref 3.6–11)

## 2023-02-28 PROCEDURE — 80185 ASSAY OF PHENYTOIN TOTAL: CPT

## 2023-02-28 PROCEDURE — 74011250637 HC RX REV CODE- 250/637: Performed by: INTERNAL MEDICINE

## 2023-02-28 PROCEDURE — 76856 US EXAM PELVIC COMPLETE: CPT

## 2023-02-28 PROCEDURE — 74011250636 HC RX REV CODE- 250/636: Performed by: INTERNAL MEDICINE

## 2023-02-28 PROCEDURE — 65270000029 HC RM PRIVATE

## 2023-02-28 PROCEDURE — 74011250636 HC RX REV CODE- 250/636: Performed by: HOSPITALIST

## 2023-02-28 PROCEDURE — 74011000258 HC RX REV CODE- 258: Performed by: HOSPITALIST

## 2023-02-28 PROCEDURE — 74011000250 HC RX REV CODE- 250: Performed by: INTERNAL MEDICINE

## 2023-02-28 PROCEDURE — 80186 ASSAY OF PHENYTOIN FREE: CPT

## 2023-02-28 PROCEDURE — 85027 COMPLETE CBC AUTOMATED: CPT

## 2023-02-28 PROCEDURE — 80048 BASIC METABOLIC PNL TOTAL CA: CPT

## 2023-02-28 PROCEDURE — 36415 COLL VENOUS BLD VENIPUNCTURE: CPT

## 2023-02-28 RX ORDER — DEXAMETHASONE SODIUM PHOSPHATE 10 MG/ML
4 INJECTION INTRAMUSCULAR; INTRAVENOUS DAILY
Status: DISCONTINUED | OUTPATIENT
Start: 2023-03-01 | End: 2023-03-02

## 2023-02-28 RX ADMIN — DEXAMETHASONE SODIUM PHOSPHATE 6 MG: 10 INJECTION INTRAMUSCULAR; INTRAVENOUS at 11:09

## 2023-02-28 RX ADMIN — SODIUM CHLORIDE, PRESERVATIVE FREE 10 ML: 5 INJECTION INTRAVENOUS at 15:36

## 2023-02-28 RX ADMIN — CEFTRIAXONE 2 G: 2 INJECTION, POWDER, FOR SOLUTION INTRAMUSCULAR; INTRAVENOUS at 15:36

## 2023-02-28 RX ADMIN — SODIUM CHLORIDE, PRESERVATIVE FREE 10 ML: 5 INJECTION INTRAVENOUS at 06:03

## 2023-02-28 RX ADMIN — FLUCONAZOLE, SODIUM CHLORIDE 400 MG: 2 INJECTION INTRAVENOUS at 01:51

## 2023-02-28 RX ADMIN — SODIUM CHLORIDE 100 MG PE: 9 INJECTION, SOLUTION INTRAVENOUS at 11:09

## 2023-02-28 RX ADMIN — SODIUM CHLORIDE 100 MG PE: 9 INJECTION, SOLUTION INTRAVENOUS at 20:49

## 2023-02-28 RX ADMIN — SODIUM CHLORIDE, PRESERVATIVE FREE 10 ML: 5 INJECTION INTRAVENOUS at 21:00

## 2023-02-28 NOTE — PROGRESS NOTES
PICC Placement Note - inclusive with additional documentation included in the patient Avatar device information, site/line assessment, and timeout:    PRE-PROCEDURE VERIFICATION  Consent obtained prior and placed on chart: yes  Correct Procedure: yes  Correct Site:  yes  Timeout Completed: yes; charted in electronic record. Temperature: Temp: 97.6 °F (36.4 °C), Temperature Source: Temp Source: Axillary  Recent Labs     02/27/23  1208   BUN 8   CREA 0.51*   HGB 11.0*   HCT 34.6*      WBC 7.6       Allergies: Patient has no known allergies. Education materials for [facility] Care given to patient and/or family: yes. See Patient Education activity for further details. PICC Booklet placed on chart: yes    PROCEDURE DETAIL:   A double lumen PICC line was started for vascular access. The following documentation is in addition to the PICC properties in the lines/airways flowsheet:  Lot #: [id number]   Exp: 11/30/2023  xylocaine used: yes  Mid-Arm Circumference: 33 (cm)  External Catheter Length: 1 (cm)  Internal Catheter Length: 34 (cm)  Total Cut Catheter Length: 35 (cm)  Location/laterality: left upper arm  Vein Selection for PICC:left basilic  Central Line Bundle followed yes  Complication Related to Insertion: none  Estimated Blood Loss: <5 ml    The placement was verified by (x-ray vs. 3CG ticketscript Tip Location Technology)3CG: yes. The ECG indicates the tip location is on the right side and the tip overlies the lower superior vena cava. CAJ    PICC line dressed per policy with BD PICC Statlock securement device, Biopatch CHG antimicrobial disk, and sterile Tegaderm dressing. Needless connectors changed all ports per policy after blood return checks. Alcohol swab end-caps utilized per policy. Line is okay to use: yes; primary care nurse, Haylie Heard, notified.      Toni Kelley RN

## 2023-02-28 NOTE — PROGRESS NOTES
[facility] Hospitalist Progress Note      Date:2/28/2023       Room:[facility]  Patient Josh Dodge     YOB: 1975     Age:47 y.o. Darron Monroy is a 56 y.o. female with PMH of cerebral palsy, cognitive disability, chronic vomiting, hypertension and seizure. Limited history due to underlying disability. She was brought to the ED from facility for evaluation of persistent vomiting and decreased oral intake. HOSPITAL COURSE:   Patient has a history of vomiting and is regularly dehydrated due to decreased p.o. intake, scheduled for a PEG placement by Dr. Charley Darling next week. In the ED, vitals stable. No leukocytosis. UA indicative of UTI. Also hyponatremia and hypokalemia. Lactic acid normalized with resuscitation. COVID-positive. CT abdomen No bowel obstruction. Large heterogeneous intrauterine mass measuring 10.0 x 6.9 cm concerning for uterine malignancy. she was planned for EGD by GI.   ------------------------    S: Awake. NAD. Resting in bed comfortably. O:    NAD  VS, Afebrile. O2 Sat 99% RA  Lungs CTA ant  Heart S1S2  Abd: fullness, Peg; NT  Neuro: chronic changes  Psych cannot be assessed    Labs/Imaging/Diagnostics    Labs:  CBC:  Recent Labs     02/28/23  1713 02/27/23 2046 02/27/23  1208   WBC 5.9 8.3 7.6   RBC 3.77* 4.14 3.70*   HGB 11.4* 12.6 11.0*   HCT 35.2 39.6 34.6*   MCV 93.4 95.7 93.5   RDW 16.1* 16.1* 15.7*    186 195       CHEMISTRIES:    LIVER PROFILE:  Recent Labs     02/25/23 2133   AST 44*   ALT 22       Lab Results   Component Value Date/Time    ALT (SGPT) 22 02/25/2023 09:33 PM    AST (SGOT) 44 (H) 02/25/2023 09:33 PM    Alk. phosphatase 75 02/25/2023 09:33 PM    Bilirubin, total 0.7 02/25/2023 09:33 PM       Imaging Last 24 Hours:  US PELV NON OBS    Result Date: 2/28/2023  EXAMINATION: US PELV NON OBS INDICATION: pelvic mass COMPARISON: CT abdomen pelvis 2/25/2023.  TECHNIQUE: Multiplanar grayscale transabdominal ultrasound of the female pelvis was performed. The examination is supplemented with color Doppler as needed. FINDINGS:  The uterus is enlarged and heterogeneous and measures 10.4 x 7.8 x 7.4 cm. The endometrial stripe is obscured due to the heterogeneity, which appears to represent replacement/enlargement of the uterus by multiple fibroids. The largest discrete measurable fibroid measures 4.0 x 3.6 x 3.3 cm. The bilateral ovaries are not well seen due to the enlarged uterus and overlying bowel gas. 1. Enlarged heterogeneous uterus which appears replaced by multiple large fibroids. A malignant uterine mass is difficult to exclude. GYN consultation is advised. Assessment//Plan   Active Problems:    Intractable nausea and vomiting (2/26/2023)  CT Results  (Last 48 hours)      None            ASSESSMENT AND PLAN    (1) uterine mass concerning for malignancy: consult OBGYN    (1) acute gastritis: continue IVF, antiemetics. Protonix. EGD 2/26/23 by Dr. Mary Ruano.  S/p PEG now.    (2) UTI: ceftriaxone and fluconazole until Ucx finalized    (3) Covid 19 coronaviral syndrome: dexamethasone,     (4) History of seizures in a patient with cerebral palsy: IV phenytoin for now pending enteric access: once established resume home regimen    (5) GERD: protonix    Disposition: Group Home ~24h    Electronically signed by Bettye Nieves MD on 2/28/2023 at 2:42 PM

## 2023-02-28 NOTE — PROGRESS NOTES
Physician Progress Note      PATIENT:               Susan Castillo  CSN #:                  [id number]  :                       1975  ADMIT DATE:       2023 8:57 PM  DISCH DATE:  RESPONDING  PROVIDER #:        Scar Mcadams MD          QUERY TEXT:    Dear Attending Provider,    Pt admitted with dehydration, UTI, Covid +, intractable nausea and vomiting. Pt noted to have fever, tachycardia, elevated lactic acid. If possible, please document in the progress notes and discharge summary if you are evaluating and /or treating any of the following: The medical record reflects the following:  Risk Factors: 56 F presents from group home with intractable nausea and vomiting, coffee ground emesis; admitted with dehydration, hypernatremia, hypokalemia, Covid-19, UTI    Clinical Indicators:  -Covid -19 + 23  -UTI  -temp max 102.3  -patient with tachycardia  -lactic acid 2.2. Author [place] 1.0    Treatment: labs, CT scan, IV fluids, IV Ceftriaxone, IV Flagyl, IV Decadron    Thank you,  GRACIE RhodesN, RN, CRCR  Clinical   [email or web address] or contact via [employer]  Options provided:  -- Sepsis, present on admission  -- Sepsis was ruled out  -- Other - I will add my own diagnosis  -- Disagree - Not applicable / Not valid  -- Disagree - Clinically unable to determine / Unknown  -- Refer to Clinical Documentation Reviewer    PROVIDER RESPONSE TEXT:    This patient has sepsis which was present on admission.     Query created by: Agata Starkey on 2023 11:09 AM      Electronically signed by:  Scar Mcadams MD 2023 2:09 PM

## 2023-02-28 NOTE — PROGRESS NOTES
Comprehensive Nutrition Assessment    Type and Reason for Visit: Consult    Nutrition Recommendations/Plan:   Start Standard w/ Fiber (Janeal Roche. 5)  TF  Initial Rate=20ml/hr advance 10ml q4; Goal Rate = 55ml/hr w/ 160mL flushes q4  Add ProSource 3x/day; provides 180kcal and 45g protein   In total; provides 1500kcal (100%), 102g protein (100%), 1634ml (102%)  HOLD TF 2hr before and 2hr after fosphenytoin administration. TF volume inadequate for complete vitamins, Consider MVI   Obtain MEASURED wt. Malnutrition Assessment:  Malnutrition Status:  Mild malnutrition (02/28/23 1720)    Context:  Chronic illness     Findings of the 6 clinical characteristics of malnutrition:   Energy Intake:  75% or less est energy requirements for 1 month or longer  Weight Loss:  Unable to assess     Body Fat Loss:  Unable to assess,     Muscle Mass Loss:  Unable to assess,    Fluid Accumulation:  No significant fluid accumulation,     Strength:  Not performed     Nutrition Assessment:    Pt admit for persistent V and decreased PO intake. Noted abd/pelvis CT scan, incidental findings of pelvic mass. Noted GYN consult. Noted EGD and PEG placement 2/27. Facility pt came from reports decreased PO intake before admit. Recs for TF above. Labs: Na 147, K 2.9, Cl 109, Creat 0.51, Ca 8.4, Ast 44, Alb 3.0  Meds: Rocephin, Decadron, Diflucan    Nutrition Related Findings:    NFPE deferred d/t isolation. No hx dysphagia reported. No D/C, +N/V reguarly. No edema. Last BM pta. Wound Type: None    Current Nutrition Intake & Therapies:  Average Meal Intake: NPO  Average Supplement Intake: NPO  DIET NPO  ADULT TUBE FEEDING PEG; Standard with Fiber; Delivery Method: Continuous; Continuous Initial Rate (mL/hr): 20; Continuous Advance Tube Feeding: Yes; Advancement Volume (mL/hr): 10; Advancement Frequency: Q 4 hours; Continuous Goal Rate (mL/hr): 55. ..     Anthropometric Measures:  Height: (P) 4' 11.02\" (149.9 cm)  Ideal Body Weight (IBW): (P) 95 lbs ((P) 43 kg)  Admission Body Weight: 149 lb 14.6 oz  Current Body Wt:  (P) 68 kg (149 lb 14.6 oz), (P) 157.8 % IBW. Other (specify)  Current BMI (kg/m2): (P) 30.3  Usual Body Weight:  (Unable to obtain)     Weight Adjustment: No adjustment  BMI Category:  (BMI not indicated for CP)  Wt Readings from Last 5 Encounters:   02/25/23 68 kg (150 lb)   02/20/23 68 kg (150 lb)   02/15/23 81.6 kg (180 lb)   Wt loss of 30 lbs most likely inaccurate d/t stated wts. Estimated Daily Nutrient Needs:  Energy Requirements Based On: (P) Formula  Weight Used for Energy Requirements: (P) Current  Energy (kcal/day): 2010-0776 kcal/day (MSJ; 1.0-1.2 Stress Factor)  Weight Used for Protein Requirements: Admission  Protein (g/day):  g/day (1.4-1.5 g/kf, for CP, infection)  Method Used for Fluid Requirements: 1 ml/kcal  Fluid (ml/day): 3179-5685 ml/kcal    Nutrition Diagnosis:   Inadequate oral intake related to altered GI function as evidenced by nausea, vomiting, poor intake prior to admission, nutrition support-enteral nutrition    Nutrition Interventions:   Food and/or Nutrient Delivery: Start tube feeding  Nutrition Education/Counseling: Education not appropriate  Coordination of Nutrition Care: Continue to monitor while inpatient  Plan of Care discussed with: RDs    Goals:  Goals: Meet at least 75% of estimated needs, Initiate nutrition support, Tolerate nutrition support at goal rate, by next RD assessment    Nutrition Monitoring and Evaluation:   Behavioral-Environmental Outcomes: None identified  Food/Nutrient Intake Outcomes: Enteral nutrition intake/tolerance, Vitamin/mineral intake  Physical Signs/Symptoms Outcomes: GI status, Weight    Discharge Planning:     Too soon to determine    [facility]: [id number]

## 2023-02-28 NOTE — PROGRESS NOTES
DC Plan: [address]    Pt received a peg tube this hospitalization. Cm attempted to contact Nixon Ocasio from [facility] home [phone]. Cm received no answer. Cm called another number for the group home [phone] and spoke with Ynes. Cm received confirmation pt can return to the group home with a peg tube. Cm received a phone call from Mr. Derik Kaur. Cm informed Mr. Thompson Cm spoke with [employer]. Pt can return to the group home with a peg tube. Per IDR this morning, discharge is >48 hrs. 1514    Cm attempted to contact pt's sister - [place] [phone]. Cm received a returned call from pt's sister. Cm discussed ordering peg tube feed/supplies. Cm received permission to reach out to an infusion agency that is in network with pt's insurance to order peg tube feed/supplies. Referral made via [facility].

## 2023-02-28 NOTE — PROGRESS NOTES
Problem: Gas Exchange - Impaired  Goal: Absence of hypoxia  Outcome: Progressing Towards Goal  Note: Patient is on room air and her O2 saturations are remaining above 90%         Bedside shift change report given to Jones Sawyer (oncoming nurse) by Breanna Mercer RN (offgoing nurse). Report included the following information SBAR, Kardex, ED Summary, Intake/Output, MAR, Accordion, and Recent Results.

## 2023-02-28 NOTE — PROGRESS NOTES
Progress Note    Patient: Haris Craven MRN: [id number]  SSN: [id number]    YOB: 1975  Age: 56 y.o.   Sex: female      Admit Date: 2/25/2023    LOS: 2 days     Subjective:   S/p peg placement  Past Medical History:   Diagnosis Date    Cerebral palsy ([facility]     HTN (hypertension)     Microcephalic ([facility]     Seizure ([facility]         Current Facility-Administered Medications:     pantoprazole (PROTONIX) tablet 40 mg, 40 mg, Oral, ACB, Bhatguy, Bernabe Raza MD    fosphenytoin (CEREBYX) 100 mg PE in 0.9% sodium chloride 50 mL IVPB, 100 mg PE, IntraVENous, Q12H, Jayant Peña MD, Last Rate: 208 mL/hr at 02/28/23 1109, 100 mg PE at 02/28/23 1109    dexamethasone (PF) (DECADRON) 10 mg/mL injection 6 mg, 6 mg, Oral, DAILY, Darius Larkin MD, 6 mg at 02/28/23 1109    sodium chloride (NS) flush 5-40 mL, 5-40 mL, IntraVENous, Q8H, Darius Larkin MD, 10 mL at 02/28/23 0603    sodium chloride (NS) flush 5-40 mL, 5-40 mL, IntraVENous, PRN, Darius Larkin MD    acetaminophen (TYLENOL) tablet 650 mg, 650 mg, Oral, Q6H PRN **OR** acetaminophen (TYLENOL) suppository 650 mg, 650 mg, Rectal, Q6H PRN, Darius Larkin MD    polyethylene glycol (MIRALAX) packet 17 g, 17 g, Oral, DAILY PRN, Darius Larkin MD    ondansetron (ZOFRAN ODT) tablet 4 mg, 4 mg, Oral, Q8H PRN **OR** ondansetron (ZOFRAN) injection 4 mg, 4 mg, IntraVENous, Q6H PRN, Darius Larkin MD    [Held by provider] enoxaparin (LOVENOX) injection 40 mg, 40 mg, SubCUTAneous, DAILY, Darius Larkin MD, 40 mg at 02/26/23 1003    metoclopramide HCl (REGLAN) injection 5 mg, 5 mg, IntraVENous, Q6H PRN, Darius Larkin MD    fluconazole (DIFLUCAN) 400mg/200 mL IVPB (premix), 400 mg, IntraVENous, Q24H, Darius Larkin MD, Last Rate: 100 mL/hr at 02/28/23 0151, 400 mg at 02/28/23 0151    albuterol (PROVENTIL HFA, VENTOLIN HFA, PROAIR HFA) inhaler 2 Puff, 2 Puff, Inhalation, Q4H PRN, Michael Garcia MD    cefTRIAXone (ROCEPHIN) 2 g in sterile water (preservative free) 20 mL IV syringe, 2 g, IntraVENous, Q24H, Michael Spencer MD, 2 g at 02/27/23 1651    Objective:     Vitals:    02/27/23 2303 02/28/23 0806 02/28/23 0953 02/28/23 1400   BP: 104/68 106/71     Pulse: 65 (!) 56     Resp: 20 18     Temp: 97.6 °F (36.4 °C) 97.7 °F (36.5 °C)     SpO2: 96% 99%     Weight:       Height:   4' 11.02\" (1.499 m) (P) 4' 11.02\" (1.499 m)        Intake and Output:  Current Shift: No intake/output data recorded. Last three shifts: 02/26 1901 - 02/28 0700  In: 672 [I.V.:672]  Out: 100 [Urine:100]    Physical Exam:   Physical Exam  Constitutional:       Appearance: She is ill-appearing. HENT:      Mouth/Throat:      Mouth: Mucous membranes are dry. Cardiovascular:      Rate and Rhythm: Normal rate. Pulmonary:      Breath sounds: Normal breath sounds. Abdominal:      Palpations: Abdomen is soft. Comments: Peg site dry   Neurological:      Mental Status: Mental status is at baseline. Lab/Data Review:  Recent Results (from the past 24 hour(s))   CBC W/O DIFF    Collection Time: 02/27/23  8:46 PM   Result Value Ref Range    WBC 8.3 3.6 - 11.0 K/uL    RBC 4.14 3.80 - 5.20 M/uL    HGB 12.6 11.5 - 16.0 g/dL    HCT 39.6 35.0 - 47.0 %    MCV 95.7 80.0 - 99.0 FL    MCH 30.4 26.0 - 34.0 PG    MCHC 31.8 30.0 - 36.5 g/dL    RDW 16.1 (H) 11.5 - 14.5 %    PLATELET 159 459 - 481 K/uL    MPV 10.8 8.9 - 12.9 FL    NRBC 0.0 0.0  WBC    ABSOLUTE NRBC 0.00 0.00 - 0.01 K/uL        US PELV NON OBS   Final Result      1. Enlarged heterogeneous uterus which appears replaced by multiple large   fibroids. A malignant uterine mass is difficult to exclude. GYN consultation is   advised. CT ABD PELV W CONT   Final Result   No bowel obstruction. Large heterogeneous intrauterine mass measuring 10.0 x 6.9 cm concerning for   uterine malignancy. XR CHEST PORT   Final Result   No acute process.            Assessment:     Active Problems:    Intractable nausea and vomiting (2/26/2023) Dehydration, intractable nausea and vomiting. S/p peg placement   Etiology unknown, sepsis, no signs of bowel obstruction,             Covid 19 coronaviral syndrome     Comfortable on room air   urinary infection at admission  Uterus fibroids/mass  Plan:    PEG  feeding as ordered.   Nutrition consultation to follow              Intravenous fluids                 Corticosteroids              Bronchodilators              Ceftriaxone  Need A  gynecologist evaluation for the uterus mass         Signed By: Ankush Payne MD     February 28, 2023        Thank you for allowing me to participate in this patients care  Cc Referring Physician   Chucho Johnson MD

## 2023-03-01 LAB
ALBUMIN SERPL-MCNC: 2.4 G/DL (ref 3.5–5)
ALBUMIN/GLOB SERPL: 0.6 (ref 1.1–2.2)
ALP SERPL-CCNC: 54 U/L (ref 45–117)
ALT SERPL-CCNC: 25 U/L (ref 12–78)
ANION GAP SERPL CALC-SCNC: 5 MMOL/L (ref 5–15)
AST SERPL W P-5'-P-CCNC: 37 U/L (ref 15–37)
BACTERIA SPEC CULT: ABNORMAL
BACTERIA SPEC CULT: ABNORMAL
BASOPHILS # BLD: 0 K/UL (ref 0–0.1)
BASOPHILS NFR BLD: 0 % (ref 0–1)
BILIRUB SERPL-MCNC: 0.3 MG/DL (ref 0.2–1)
BUN SERPL-MCNC: 11 MG/DL (ref 6–20)
BUN/CREAT SERPL: 18 (ref 12–20)
CA-I BLD-MCNC: 8.5 MG/DL (ref 8.5–10.1)
CHLORIDE SERPL-SCNC: 111 MMOL/L (ref 97–108)
CO2 SERPL-SCNC: 32 MMOL/L (ref 21–32)
COLONY COUNT,CNT: ABNORMAL
CREAT SERPL-MCNC: 0.6 MG/DL (ref 0.55–1.02)
CRP SERPL-MCNC: 7.43 MG/DL (ref 0–0.6)
DIFFERENTIAL METHOD BLD: ABNORMAL
EOSINOPHIL # BLD: 0 K/UL (ref 0–0.4)
EOSINOPHIL NFR BLD: 0 % (ref 0–7)
ERYTHROCYTE [DISTWIDTH] IN BLOOD BY AUTOMATED COUNT: 16.3 % (ref 11.5–14.5)
GLOBULIN SER CALC-MCNC: 3.9 G/DL (ref 2–4)
GLUCOSE SERPL-MCNC: 167 MG/DL (ref 65–100)
HCT VFR BLD AUTO: 35.9 % (ref 35–47)
HGB BLD-MCNC: 11.4 G/DL (ref 11.5–16)
IMM GRANULOCYTES # BLD AUTO: 0.1 K/UL (ref 0–0.04)
IMM GRANULOCYTES NFR BLD AUTO: 1 % (ref 0–0.5)
LYMPHOCYTES # BLD: 0.4 K/UL (ref 0.8–3.5)
LYMPHOCYTES NFR BLD: 6 % (ref 12–49)
MAGNESIUM SERPL-MCNC: 2.1 MG/DL (ref 1.6–2.4)
MCH RBC QN AUTO: 29.8 PG (ref 26–34)
MCHC RBC AUTO-ENTMCNC: 31.8 G/DL (ref 30–36.5)
MCV RBC AUTO: 93.7 FL (ref 80–99)
MONOCYTES # BLD: 0.4 K/UL (ref 0–1)
MONOCYTES NFR BLD: 6 % (ref 5–13)
NEUTS SEG # BLD: 5.2 K/UL (ref 1.8–8)
NEUTS SEG NFR BLD: 87 % (ref 32–75)
NRBC # BLD: 0.02 K/UL (ref 0–0.01)
NRBC BLD-RTO: 0.3 PER 100 WBC
PHENYTOIN FREE SERPL-MCNC: 0.9 UG/ML (ref 1–2)
PLATELET # BLD AUTO: 201 K/UL (ref 150–400)
PMV BLD AUTO: 11 FL (ref 8.9–12.9)
POTASSIUM SERPL-SCNC: 2.8 MMOL/L (ref 3.5–5.1)
PROCALCITONIN SERPL-MCNC: <0.05 NG/ML
PROT SERPL-MCNC: 6.3 G/DL (ref 6.4–8.2)
RBC # BLD AUTO: 3.83 M/UL (ref 3.8–5.2)
SODIUM SERPL-SCNC: 148 MMOL/L (ref 136–145)
SPECIAL REQUESTS,SREQ: ABNORMAL
WBC # BLD AUTO: 6 K/UL (ref 3.6–11)

## 2023-03-01 PROCEDURE — 74011250637 HC RX REV CODE- 250/637: Performed by: INTERNAL MEDICINE

## 2023-03-01 PROCEDURE — 74011250636 HC RX REV CODE- 250/636: Performed by: INTERNAL MEDICINE

## 2023-03-01 PROCEDURE — 83735 ASSAY OF MAGNESIUM: CPT

## 2023-03-01 PROCEDURE — 74011000250 HC RX REV CODE- 250: Performed by: INTERNAL MEDICINE

## 2023-03-01 PROCEDURE — 84145 PROCALCITONIN (PCT): CPT

## 2023-03-01 PROCEDURE — 65270000029 HC RM PRIVATE

## 2023-03-01 PROCEDURE — 86140 C-REACTIVE PROTEIN: CPT

## 2023-03-01 PROCEDURE — 36415 COLL VENOUS BLD VENIPUNCTURE: CPT

## 2023-03-01 PROCEDURE — 80053 COMPREHEN METABOLIC PANEL: CPT

## 2023-03-01 PROCEDURE — 85025 COMPLETE CBC W/AUTO DIFF WBC: CPT

## 2023-03-01 PROCEDURE — 74011250637 HC RX REV CODE- 250/637: Performed by: HOSPITALIST

## 2023-03-01 RX ORDER — PHENYTOIN SODIUM 100 MG/1
200 CAPSULE, EXTENDED RELEASE ORAL DAILY
Status: DISCONTINUED | OUTPATIENT
Start: 2023-03-01 | End: 2023-03-03

## 2023-03-01 RX ORDER — LAMOTRIGINE 100 MG/1
200 TABLET ORAL DAILY
Status: DISCONTINUED | OUTPATIENT
Start: 2023-03-01 | End: 2023-03-03 | Stop reason: HOSPADM

## 2023-03-01 RX ORDER — POTASSIUM CHLORIDE 1.5 G/1.77G
40 POWDER, FOR SOLUTION ORAL
Status: COMPLETED | OUTPATIENT
Start: 2023-03-01 | End: 2023-03-01

## 2023-03-01 RX ORDER — POTASSIUM CHLORIDE 7.45 MG/ML
10 INJECTION INTRAVENOUS
Status: COMPLETED | OUTPATIENT
Start: 2023-03-01 | End: 2023-03-01

## 2023-03-01 RX ORDER — MIRTAZAPINE 15 MG/1
15 TABLET, FILM COATED ORAL
Status: DISCONTINUED | OUTPATIENT
Start: 2023-03-01 | End: 2023-03-03 | Stop reason: HOSPADM

## 2023-03-01 RX ADMIN — MIRTAZAPINE 15 MG: 15 TABLET, FILM COATED ORAL at 21:20

## 2023-03-01 RX ADMIN — POTASSIUM CHLORIDE 40 MEQ: 1.5 POWDER, FOR SOLUTION ORAL at 10:10

## 2023-03-01 RX ADMIN — EXTENDED PHENYTOIN SODIUM 30 MG: 30 CAPSULE ORAL at 09:00

## 2023-03-01 RX ADMIN — CEFTRIAXONE 2 G: 2 INJECTION, POWDER, FOR SOLUTION INTRAMUSCULAR; INTRAVENOUS at 16:26

## 2023-03-01 RX ADMIN — POTASSIUM CHLORIDE 10 MEQ: 7.46 INJECTION, SOLUTION INTRAVENOUS at 11:00

## 2023-03-01 RX ADMIN — SODIUM CHLORIDE, PRESERVATIVE FREE 10 ML: 5 INJECTION INTRAVENOUS at 21:20

## 2023-03-01 RX ADMIN — LAMOTRIGINE 200 MG: 100 TABLET ORAL at 09:03

## 2023-03-01 RX ADMIN — ENOXAPARIN SODIUM 40 MG: 100 INJECTION SUBCUTANEOUS at 09:02

## 2023-03-01 RX ADMIN — SODIUM CHLORIDE, PRESERVATIVE FREE 10 ML: 5 INJECTION INTRAVENOUS at 05:58

## 2023-03-01 RX ADMIN — PANTOPRAZOLE SODIUM 40 MG: 40 TABLET, DELAYED RELEASE ORAL at 09:02

## 2023-03-01 RX ADMIN — POTASSIUM CHLORIDE 10 MEQ: 7.46 INJECTION, SOLUTION INTRAVENOUS at 10:11

## 2023-03-01 RX ADMIN — DEXAMETHASONE SODIUM PHOSPHATE 4 MG: 10 INJECTION INTRAMUSCULAR; INTRAVENOUS at 09:03

## 2023-03-01 RX ADMIN — SODIUM CHLORIDE, PRESERVATIVE FREE 10 ML: 5 INJECTION INTRAVENOUS at 16:27

## 2023-03-01 RX ADMIN — FLUCONAZOLE, SODIUM CHLORIDE 400 MG: 2 INJECTION INTRAVENOUS at 01:38

## 2023-03-01 RX ADMIN — PHENYTOIN SODIUM 200 MG: 100 CAPSULE ORAL at 09:02

## 2023-03-01 NOTE — PROGRESS NOTES
20:00 tube feeding started jeveity 1.5 at 20 ml / hr with 160 ml water flushes every 4 hours. 00:00 Residual checked 5 ml noted, Pt's tube feed advanced to 30 ml/hr. Pt tolerating well no issue at this time.   04:00 Residual checked 50 ml. MD Olea notified ok not to advance now, advance at 8 am

## 2023-03-01 NOTE — ANTIMICROBIAL STEWARDSHIP
The Antimicrobial Stewardship Team has reviewed current therapy. Patient is on day #4 of Rocephin for UTI. Urine grew yeast and patient is on fluconazole. Consider d/c Rocephin, as it puts the patient at increased risk for developing resistance, along with C.difficile.

## 2023-03-01 NOTE — PROGRESS NOTES
[facility] Hospitalist Progress Note      Date:3/1/2023       Room:[facility]  Patient Liana Dodge     YOB: 1975     Age:47 y.o. Tyler Mercado is a 56 y.o. female with PMH of cerebral palsy, cognitive disability, chronic vomiting, hypertension and seizure. Limited history due to underlying disability. She was brought to the ED from facility for evaluation of persistent vomiting and decreased oral intake. HOSPITAL COURSE:   Patient has a history of vomiting and is regularly dehydrated due to decreased p.o. intake, scheduled for a PEG placement by Dr. Quinn Connelly next week. In the ED, vitals stable. No leukocytosis. UA indicative of UTI. Also hyponatremia and hypokalemia. Lactic acid normalized with resuscitation. COVID-positive. CT abdomen No bowel obstruction. Large heterogeneous intrauterine mass measuring 10.0 x 6.9 cm concerning for uterine malignancy. she was planned for EGD by GI.   ------------------------    S: No new complaint. Discussed with nursing. Await final cx. Replace K+, Free Water via Peg    O:    NAD  VS, Afebrile. O2 Sat 99% RA  Lungs CTA ant  Heart S1S2  Abd: fullness, Peg; NT  Neuro: chronic changes  Psych cannot be assessed    Labs/Imaging/Diagnostics      Recent Labs     03/01/23  0726 02/28/23  1713 02/27/23  2046   WBC 6.0 5.9 8.3   RBC 3.83 3.77* 4.14   HGB 11.4* 11.4* 12.6   HCT 35.9 35.2 39.6   MCV 93.7 93.4 95.7   RDW 16.3* 16.1* 16.1*    205 186     Bmp reviewed. ASSESSMENT AND PLAN    (1) uterine mass concerning for malignancy: consult OBGYN    (1) acute gastritis: continue IVF, antiemetics. Protonix. EGD 2/26/23 by Dr. Quinn Connelly.  S/p PEG now.    (2) UTI: ceftriaxone and fluconazole until Ucx finalized    (3) Covid 19 coronaviral syndrome: dexamethasone,     (4) History of seizures in a patient with cerebral palsy: IV phenytoin for now pending enteric access: once established resume home regimen    (5) GERD: protonix    (6) Hypokalemia    (7) Hypernatremia    Disposition/Plan: Replace K+, Free H2O; change IV meds --> Peg. Await final cx results.     Electronically signed by Shelly Sandoval MD on 3/1/2023 at 2:42 PM

## 2023-03-01 NOTE — PROGRESS NOTES
Problem: Airway Clearance - Ineffective  Goal: Achieve or maintain patent airway  Outcome: Progressing Towards Goal     Problem: Gas Exchange - Impaired  Goal: Absence of hypoxia  Outcome: Progressing Towards Goal     Problem: Breathing Pattern - Ineffective  Goal: Ability to achieve and maintain a regular respiratory rate  Outcome: Progressing Towards Goal     Problem: Risk for Fluid Volume Deficit  Goal: Maintain normal heart rhythm  Outcome: Progressing Towards Goal     Problem: Fatigue  Goal: Verbalize increase energy and improved vitality  Outcome: Progressing Towards Goal     Problem: Pressure Injury - Risk of  Goal: *Prevention of pressure injury  Description: Document Ziyad Scale and appropriate interventions in the flowsheet.   Outcome: Progressing Towards Goal  Note: Pressure Injury Interventions:  Sensory Interventions: Assess changes in LOC    Moisture Interventions: Absorbent underpads    Activity Interventions: Assess need for specialty bed    Mobility Interventions: Assess need for specialty bed    Nutrition Interventions: Document food/fluid/supplement intake    Friction and Shear Interventions: Minimize layers

## 2023-03-01 NOTE — PROGRESS NOTES
Problem: Airway Clearance - Ineffective  Goal: Achieve or maintain patent airway  Outcome: Progressing Towards Goal     Problem: Gas Exchange - Impaired  Goal: Absence of hypoxia  Outcome: Progressing Towards Goal  Goal: Promote optimal lung function  Outcome: Progressing Towards Goal     Problem: Breathing Pattern - Ineffective  Goal: Ability to achieve and maintain a regular respiratory rate  Outcome: Progressing Towards Goal     Problem:  Body Temperature -  Risk of, Imbalanced  Goal: Ability to maintain a body temperature within defined limits  Outcome: Progressing Towards Goal  Goal: Will regain or maintain usual level of consciousness  Outcome: Progressing Towards Goal  Goal: Complications related to the disease process, condition or treatment will be avoided or minimized  Outcome: Progressing Towards Goal     Problem: Isolation Precautions - Risk of Spread of Infection  Goal: Prevent transmission of infectious organism to others  Outcome: Progressing Towards Goal     Problem: Nutrition Deficits  Goal: Optimize nutrtional status  Outcome: Progressing Towards Goal     Problem: Risk for Fluid Volume Deficit  Goal: Maintain normal heart rhythm  Outcome: Progressing Towards Goal  Goal: Maintain absence of muscle cramping  Outcome: Progressing Towards Goal  Goal: Maintain normal serum potassium, sodium, calcium, phosphorus, and pH  Outcome: Progressing Towards Goal     Problem: Loneliness or Risk for Loneliness  Goal: Demonstrate positive use of time alone when socialization is not possible  Outcome: Progressing Towards Goal     Problem: Fatigue  Goal: Verbalize increase energy and improved vitality  Outcome: Progressing Towards Goal     Problem: Patient Education: Go to Patient Education Activity  Goal: Patient/Family Education  Outcome: Progressing Towards Goal     Problem: Non-Violent Restraints  Goal: Removal from restraints as soon as assessed to be safe  Outcome: Progressing Towards Goal  Goal: No harm/injury to patient while restraints in use  Outcome: Progressing Towards Goal  Goal: Patient's dignity will be maintained  Outcome: Progressing Towards Goal  Goal: Patient Interventions  Outcome: Progressing Towards Goal     Problem: Pressure Injury - Risk of  Goal: *Prevention of pressure injury  Description: Document Ziyad Scale and appropriate interventions in the flowsheet. Outcome: Progressing Towards Goal  Note: Pressure Injury Interventions:  Sensory Interventions: Assess changes in LOC    Moisture Interventions: Absorbent underpads    Activity Interventions: Assess need for specialty bed, PT/OT evaluation    Mobility Interventions: Assess need for specialty bed, HOB 30 degrees or less    Nutrition Interventions: Document food/fluid/supplement intake    Friction and Shear Interventions: Minimize layers                Problem: Patient Education: Go to Patient Education Activity  Goal: Patient/Family Education  Outcome: Progressing Towards Goal     Problem: Falls - Risk of  Goal: *Absence of Falls  Description: Document Kisha Fall Risk and appropriate interventions in the flowsheet.   Outcome: Progressing Towards Goal     Problem: Patient Education: Go to Patient Education Activity  Goal: Patient/Family Education  Outcome: Progressing Towards Goal

## 2023-03-02 LAB
ANION GAP SERPL CALC-SCNC: 4 MMOL/L (ref 5–15)
BUN SERPL-MCNC: 6 MG/DL (ref 6–20)
BUN/CREAT SERPL: 13 (ref 12–20)
CA-I BLD-MCNC: 7.7 MG/DL (ref 8.5–10.1)
CHLORIDE SERPL-SCNC: 109 MMOL/L (ref 97–108)
CO2 SERPL-SCNC: 32 MMOL/L (ref 21–32)
CREAT SERPL-MCNC: 0.47 MG/DL (ref 0.55–1.02)
GLUCOSE SERPL-MCNC: 185 MG/DL (ref 65–100)
POTASSIUM SERPL-SCNC: 3.4 MMOL/L (ref 3.5–5.1)
SODIUM SERPL-SCNC: 145 MMOL/L (ref 136–145)

## 2023-03-02 PROCEDURE — 36415 COLL VENOUS BLD VENIPUNCTURE: CPT

## 2023-03-02 PROCEDURE — 74011000250 HC RX REV CODE- 250: Performed by: INTERNAL MEDICINE

## 2023-03-02 PROCEDURE — 65270000029 HC RM PRIVATE

## 2023-03-02 PROCEDURE — 74011250637 HC RX REV CODE- 250/637: Performed by: HOSPITALIST

## 2023-03-02 PROCEDURE — 80048 BASIC METABOLIC PNL TOTAL CA: CPT

## 2023-03-02 PROCEDURE — 74011250636 HC RX REV CODE- 250/636: Performed by: INTERNAL MEDICINE

## 2023-03-02 PROCEDURE — 74011250637 HC RX REV CODE- 250/637: Performed by: INTERNAL MEDICINE

## 2023-03-02 RX ADMIN — CEFTRIAXONE 2 G: 2 INJECTION, POWDER, FOR SOLUTION INTRAMUSCULAR; INTRAVENOUS at 16:36

## 2023-03-02 RX ADMIN — LAMOTRIGINE 200 MG: 100 TABLET ORAL at 09:41

## 2023-03-02 RX ADMIN — ENOXAPARIN SODIUM 40 MG: 100 INJECTION SUBCUTANEOUS at 09:41

## 2023-03-02 RX ADMIN — SODIUM CHLORIDE, PRESERVATIVE FREE 10 ML: 5 INJECTION INTRAVENOUS at 16:37

## 2023-03-02 RX ADMIN — DEXAMETHASONE SODIUM PHOSPHATE 4 MG: 10 INJECTION INTRAMUSCULAR; INTRAVENOUS at 09:41

## 2023-03-02 RX ADMIN — FLUCONAZOLE, SODIUM CHLORIDE 400 MG: 2 INJECTION INTRAVENOUS at 01:30

## 2023-03-02 RX ADMIN — SODIUM CHLORIDE, PRESERVATIVE FREE 10 ML: 5 INJECTION INTRAVENOUS at 21:18

## 2023-03-02 RX ADMIN — EXTENDED PHENYTOIN SODIUM 30 MG: 30 CAPSULE ORAL at 09:41

## 2023-03-02 RX ADMIN — MIRTAZAPINE 15 MG: 15 TABLET, FILM COATED ORAL at 21:18

## 2023-03-02 RX ADMIN — PANTOPRAZOLE SODIUM 40 MG: 40 TABLET, DELAYED RELEASE ORAL at 06:20

## 2023-03-02 RX ADMIN — PHENYTOIN SODIUM 200 MG: 100 CAPSULE ORAL at 09:40

## 2023-03-02 RX ADMIN — SODIUM CHLORIDE, PRESERVATIVE FREE 10 ML: 5 INJECTION INTRAVENOUS at 06:20

## 2023-03-02 NOTE — PROGRESS NOTES
Problem: Airway Clearance - Ineffective  Goal: Achieve or maintain patent airway  Outcome: Progressing Towards Goal     Problem: Gas Exchange - Impaired  Goal: Absence of hypoxia  Outcome: Progressing Towards Goal  Goal: Promote optimal lung function  Outcome: Progressing Towards Goal     Problem: Breathing Pattern - Ineffective  Goal: Ability to achieve and maintain a regular respiratory rate  Outcome: Progressing Towards Goal     Problem:  Body Temperature -  Risk of, Imbalanced  Goal: Ability to maintain a body temperature within defined limits  Outcome: Progressing Towards Goal  Goal: Will regain or maintain usual level of consciousness  Outcome: Progressing Towards Goal  Goal: Complications related to the disease process, condition or treatment will be avoided or minimized  Outcome: Progressing Towards Goal     Problem: Isolation Precautions - Risk of Spread of Infection  Goal: Prevent transmission of infectious organism to others  Outcome: Progressing Towards Goal     Problem: Nutrition Deficits  Goal: Optimize nutrtional status  Outcome: Progressing Towards Goal     Problem: Risk for Fluid Volume Deficit  Goal: Maintain normal heart rhythm  Outcome: Progressing Towards Goal  Goal: Maintain absence of muscle cramping  Outcome: Progressing Towards Goal  Goal: Maintain normal serum potassium, sodium, calcium, phosphorus, and pH  Outcome: Progressing Towards Goal     Problem: Loneliness or Risk for Loneliness  Goal: Demonstrate positive use of time alone when socialization is not possible  Outcome: Progressing Towards Goal     Problem: Fatigue  Goal: Verbalize increase energy and improved vitality  Outcome: Progressing Towards Goal     Problem: Patient Education: Go to Patient Education Activity  Goal: Patient/Family Education  Outcome: Progressing Towards Goal     Problem: Non-Violent Restraints  Goal: Removal from restraints as soon as assessed to be safe  Outcome: Progressing Towards Goal  Goal: No harm/injury to patient while restraints in use  Outcome: Progressing Towards Goal  Goal: Patient's dignity will be maintained  Outcome: Progressing Towards Goal  Goal: Patient Interventions  Outcome: Progressing Towards Goal     Problem: Pressure Injury - Risk of  Goal: *Prevention of pressure injury  Description: Document Ziyad Scale and appropriate interventions in the flowsheet. Outcome: Progressing Towards Goal  Note: Pressure Injury Interventions:  Sensory Interventions: Maintain/enhance activity level, Keep linens dry and wrinkle-free    Moisture Interventions: Absorbent underpads, Internal/External urinary devices, Maintain skin hydration (lotion/cream)    Activity Interventions: Assess need for specialty bed, PT/OT evaluation    Mobility Interventions: Assess need for specialty bed, HOB 30 degrees or less, PT/OT evaluation    Nutrition Interventions: Document food/fluid/supplement intake, Offer support with meals,snacks and hydration    Friction and Shear Interventions: Minimize layers, HOB 30 degrees or less                Problem: Patient Education: Go to Patient Education Activity  Goal: Patient/Family Education  Outcome: Progressing Towards Goal     Problem: Falls - Risk of  Goal: *Absence of Falls  Description: Document Kisha Fall Risk and appropriate interventions in the flowsheet.   Outcome: Progressing Towards Goal     Problem: Patient Education: Go to Patient Education Activity  Goal: Patient/Family Education  Outcome: Progressing Towards Goal

## 2023-03-02 NOTE — PROGRESS NOTES
[facility] Hospitalist Progress Note      Date:3/2/2023       Room:[facility]  Patient Sin Dodge     YOB: 1975     Age:47 y.o. Ezio Munson is a 56 y.o. female with PMH of cerebral palsy, cognitive disability, chronic vomiting, hypertension and seizure. Limited history due to underlying disability. She was brought to the ED from facility for evaluation of persistent vomiting and decreased oral intake. HOSPITAL COURSE:   Patient has a history of vomiting and is regularly dehydrated due to decreased p.o. intake, scheduled for a PEG placement by Dr. Brandon Morales next week. In the ED, vitals stable. No leukocytosis. UA indicative of UTI. Also hyponatremia and hypokalemia. Lactic acid normalized with resuscitation. COVID-positive. CT abdomen No bowel obstruction. Large heterogeneous intrauterine mass measuring 10.0 x 6.9 cm concerning for uterine malignancy. she was planned for EGD by GI.   ------------------------    S: Lab pending. Appears well. Tolerating TF. Urine Cx --> candida; will get opinion from ID.     O:    NAD  VS, Afebrile. O2 Sat 99% RA  Lungs CTA ant  Heart S1S2  Abd: fullness, Peg; NT  Neuro: chronic changes  Psych cannot be assessed    Labs/Imaging/Diagnostics      Recent Labs     03/01/23  0726 02/28/23  1713 02/27/23  2046   WBC 6.0 5.9 8.3   RBC 3.83 3.77* 4.14   HGB 11.4* 11.4* 12.6   HCT 35.9 35.2 39.6   MCV 93.7 93.4 95.7   RDW 16.3* 16.1* 16.1*    205 186     Bmp reviewed. ASSESSMENT AND PLAN    (1) uterine mass concerning for malignancy: consult OBGYN    (1) acute gastritis: continue IVF, antiemetics. Protonix. EGD 2/26/23 by Dr. Brandon Morales.  S/p PEG now.    (2) UTI: ceftriaxone and fluconazole until Ucx finalized    (3) Covid 19 coronaviral syndrome: dexamethasone,     (4) History of seizures in a patient with cerebral palsy: IV phenytoin for now pending enteric access: once established resume home regimen    (5) GERD: protonix    (6) Hypokalemia    (7) Hypernatremia    Disposition/Plan: ID consult regarding anti-fungal. Dc Rocephin? Possible DC tomorrow to group home. DC decadron. Lab pending.     Electronically signed by Marika Jha MD on 3/2/2023 at 2:42 PM

## 2023-03-02 NOTE — PROGRESS NOTES
Problem: Airway Clearance - Ineffective  Goal: Achieve or maintain patent airway  Outcome: Progressing Towards Goal     Problem: Gas Exchange - Impaired  Goal: Absence of hypoxia  Outcome: Progressing Towards Goal  Goal: Promote optimal lung function  Outcome: Progressing Towards Goal     Problem: Pressure Injury - Risk of  Goal: *Prevention of pressure injury  Description: Document Ziyad Scale and appropriate interventions in the flowsheet. Outcome: Progressing Towards Goal  Note: Pressure Injury Interventions:  Sensory Interventions: Assess changes in LOC    Moisture Interventions: Absorbent underpads    Activity Interventions: Assess need for specialty bed    Mobility Interventions: Assess need for specialty bed    Nutrition Interventions: Document food/fluid/supplement intake    Friction and Shear Interventions: Minimize layers                Problem: Falls - Risk of  Goal: *Absence of Falls  Description: Document Kisha Fall Risk and appropriate interventions in the flowsheet.   Outcome: Progressing Towards Goal  Note: Fall Risk Interventions:       Mentation Interventions: Adequate sleep, hydration, pain control, Bed/chair exit alarm, Door open when patient unattended, More frequent rounding, Reorient patient, Toileting rounds, Update white board    Medication Interventions: Bed/chair exit alarm, Evaluate medications/consider consulting pharmacy, Patient to call before getting OOB    Elimination Interventions: Call light in reach, Patient to call for help with toileting needs, Toileting schedule/hourly rounds

## 2023-03-02 NOTE — PROGRESS NOTES
DC Plan: Lior Eden notified [facility] via [facility] of anticipated discharge for tomorrow. Patrizia Sommers will get a hold of pt's group home to coordinate.

## 2023-03-03 VITALS
DIASTOLIC BLOOD PRESSURE: 64 MMHG | TEMPERATURE: 97.8 F | HEIGHT: 59 IN | HEART RATE: 79 BPM | BODY MASS INDEX: 30.24 KG/M2 | SYSTOLIC BLOOD PRESSURE: 96 MMHG | RESPIRATION RATE: 16 BRPM | WEIGHT: 150 LBS | OXYGEN SATURATION: 98 %

## 2023-03-03 LAB
ANION GAP SERPL CALC-SCNC: 1 MMOL/L (ref 5–15)
BACTERIA SPEC CULT: NORMAL
BUN SERPL-MCNC: 7 MG/DL (ref 6–20)
BUN/CREAT SERPL: 16 (ref 12–20)
CA-I BLD-MCNC: 7.6 MG/DL (ref 8.5–10.1)
CHLORIDE SERPL-SCNC: 108 MMOL/L (ref 97–108)
CO2 SERPL-SCNC: 34 MMOL/L (ref 21–32)
CREAT SERPL-MCNC: 0.43 MG/DL (ref 0.55–1.02)
GLUCOSE SERPL-MCNC: 120 MG/DL (ref 65–100)
POTASSIUM SERPL-SCNC: 3.2 MMOL/L (ref 3.5–5.1)
SODIUM SERPL-SCNC: 143 MMOL/L (ref 136–145)
SPECIAL REQUESTS,SREQ: NORMAL

## 2023-03-03 PROCEDURE — 80048 BASIC METABOLIC PNL TOTAL CA: CPT

## 2023-03-03 PROCEDURE — 74011250637 HC RX REV CODE- 250/637: Performed by: HOSPITALIST

## 2023-03-03 PROCEDURE — 74011000250 HC RX REV CODE- 250: Performed by: INTERNAL MEDICINE

## 2023-03-03 PROCEDURE — 74011250637 HC RX REV CODE- 250/637: Performed by: INTERNAL MEDICINE

## 2023-03-03 PROCEDURE — 74011250636 HC RX REV CODE- 250/636: Performed by: INTERNAL MEDICINE

## 2023-03-03 PROCEDURE — 36415 COLL VENOUS BLD VENIPUNCTURE: CPT

## 2023-03-03 RX ORDER — PHENYTOIN 125 MG/5ML
212 SUSPENSION ORAL DAILY
Qty: 300 ML | Refills: 1 | Status: SHIPPED | OUTPATIENT
Start: 2023-03-04

## 2023-03-03 RX ORDER — PHENYTOIN 125 MG/5ML
212 SUSPENSION ORAL DAILY
Status: DISCONTINUED | OUTPATIENT
Start: 2023-03-03 | End: 2023-03-03 | Stop reason: HOSPADM

## 2023-03-03 RX ORDER — PANTOPRAZOLE SODIUM 40 MG/1
40 TABLET, DELAYED RELEASE ORAL
Qty: 30 TABLET | Refills: 0 | Status: SHIPPED | OUTPATIENT
Start: 2023-03-04

## 2023-03-03 RX ADMIN — SODIUM CHLORIDE, PRESERVATIVE FREE 10 ML: 5 INJECTION INTRAVENOUS at 15:41

## 2023-03-03 RX ADMIN — FLUCONAZOLE, SODIUM CHLORIDE 400 MG: 2 INJECTION INTRAVENOUS at 01:39

## 2023-03-03 RX ADMIN — ENOXAPARIN SODIUM 40 MG: 100 INJECTION SUBCUTANEOUS at 10:17

## 2023-03-03 RX ADMIN — PANTOPRAZOLE SODIUM 40 MG: 40 TABLET, DELAYED RELEASE ORAL at 05:31

## 2023-03-03 RX ADMIN — LAMOTRIGINE 200 MG: 100 TABLET ORAL at 10:17

## 2023-03-03 RX ADMIN — PHENYTOIN 212 MG: 125 SUSPENSION ORAL at 12:54

## 2023-03-03 RX ADMIN — SODIUM CHLORIDE, PRESERVATIVE FREE 10 ML: 5 INJECTION INTRAVENOUS at 05:31

## 2023-03-03 NOTE — PROGRESS NOTES
Problem: Airway Clearance - Ineffective  Goal: Achieve or maintain patent airway  Outcome: Progressing Towards Goal     Problem: Gas Exchange - Impaired  Goal: Absence of hypoxia  Outcome: Progressing Towards Goal  Goal: Promote optimal lung function  Outcome: Progressing Towards Goal     Problem: Nutrition Deficits  Goal: Optimize nutrtional status  Outcome: Progressing Towards Goal     Problem: Pressure Injury - Risk of  Goal: *Prevention of pressure injury  Description: Document Ziyad Scale and appropriate interventions in the flowsheet. Outcome: Progressing Towards Goal  Note: Pressure Injury Interventions:  Sensory Interventions: Maintain/enhance activity level    Moisture Interventions: Absorbent underpads    Activity Interventions: Assess need for specialty bed    Mobility Interventions: Assess need for specialty bed    Nutrition Interventions: Document food/fluid/supplement intake    Friction and Shear Interventions: Minimize layers                Problem: Falls - Risk of  Goal: *Absence of Falls  Description: Document Kisha Fall Risk and appropriate interventions in the flowsheet.   Outcome: Progressing Towards Goal  Note: Fall Risk Interventions:  Mobility Interventions: Bed/chair exit alarm    Mentation Interventions: Bed/chair exit alarm    Medication Interventions: Bed/chair exit alarm    Elimination Interventions: Bed/chair exit alarm

## 2023-03-03 NOTE — PROGRESS NOTES
Update on transport: [facility] to  patient at 2200. Mr. Amy Hitchcock at group home updated as well in regards to ETA of transport.

## 2023-03-03 NOTE — CONSULTS
Consult Date: 3/3/2023    Consults  Canidida UTI    Subjective   This is a 56 year female with cerebral palsy brought to ED by EMS because of hematemesis. On presentation, straight catheterization performed with urine showing marked pyuria and budding yeasts. Urine culture grew Candida lusitaniae and Candida albicans. Patient was started on high dose Fluconazole. ID has been consulted for this reason. She was febrile on admission but quickly defervesced and has remained afebrile. WBC has been normal throughout with normal procal but CRP was elevated. .  CT Abdomen showed a large intrauterine mass but kidneys were unremarkable. Images reviewed by me. Past Medical History:   Diagnosis Date    Cerebral palsy ([facility] [address]     HTN (hypertension)     Microcephalic ([facility] [address]     Seizure ([facility] [address]       No past surgical history on file. No family history on file.    Social History     Tobacco Use    Smoking status: Not on file    Smokeless tobacco: Not on file   Substance Use Topics    Alcohol use: Not on file       Current Facility-Administered Medications   Medication Dose Route Frequency Provider Last Rate Last Admin    lamoTRIgine (LaMICtal) tablet 200 mg  200 mg Per G Tube DAILY Tyler Azul MD   200 mg at 03/02/23 0941    mirtazapine (REMERON) tablet 15 mg  15 mg Per G Tube QHS Tyler Azul MD   15 mg at 03/02/23 2118    phenytoin ER (DILANTIN ER) ER capsule 200 mg  200 mg Oral DAILY Tyler Azul MD   200 mg at 03/02/23 0940    phenytoin ER (DILANTIN ER) ER capsule 30 mg  30 mg Oral DAILY Tyler Azul MD   30 mg at 03/02/23 0941    pantoprazole (PROTONIX) tablet 40 mg  40 mg Oral ACB Rafia Guzman MD   40 mg at 03/03/23 0531    sodium chloride (NS) flush 5-40 mL  5-40 mL IntraVENous Bobbi Acosta MD   10 mL at 03/03/23 0531    sodium chloride (NS) flush 5-40 mL  5-40 mL IntraVENous PRN Darnell Medina MD        acetaminophen (TYLENOL) tablet 650 mg  650 mg Oral Q6H PRN Darnell Medina MD        Or acetaminophen (TYLENOL) suppository 650 mg  650 mg Rectal Q6H PRN Darlyn Larkin MD        polyethylene glycol (MIRALAX) packet 17 g  17 g Oral DAILY PRN Darlyn Larkin MD        ondansetron (ZOFRAN ODT) tablet 4 mg  4 mg Oral Q8H PRN Darlyn Larkin MD        Or    ondansetron (ZOFRAN) injection 4 mg  4 mg IntraVENous Q6H PRN Darlyn Larkin MD        enoxaparin (LOVENOX) injection 40 mg  40 mg SubCUTAneous DAILY Darlyn Larkin MD   40 mg at 03/02/23 0941    metoclopramide HCl (REGLAN) injection 5 mg  5 mg IntraVENous Q6H PRN Gurinder House MD        fluconazole (DIFLUCAN) 400mg/200 mL IVPB (premix)  400 mg IntraVENous Q24H Gurinder House  mL/hr at 03/03/23 0139 400 mg at 03/03/23 0139    albuterol (PROVENTIL HFA, VENTOLIN HFA, PROAIR HFA) inhaler 2 Puff  2 Puff Inhalation Q4H PRN Pati Anthony MD        cefTRIAXone (ROCEPHIN) 2 g in sterile water (preservative free) 20 mL IV syringe  2 g IntraVENous Q24H Pati Anthony MD   2 g at 03/02/23 1636        Review of Systems   Unable to perform ROS: Mental status change     Objective     Vital signs for last 24 hours:  Visit Vitals  /76   Pulse 69   Temp 97.9 °F (36.6 °C)   Resp 16   Ht (P) 4' 11.02\" (1.499 m)   Wt 150 lb (68 kg)   SpO2 98%   BMI (P) 30.28 kg/m²       Intake/Output this shift:  Current Shift: No intake/output data recorded.   Last 3 Shifts: 03/01 1901 - 03/03 0700  In: 900 [I.V.:400]  Out: -     Data Review:   Recent Results (from the past 24 hour(s))   METABOLIC PANEL, BASIC    Collection Time: 03/02/23  7:45 PM   Result Value Ref Range    Sodium 145 136 - 145 mmol/L    Potassium 3.4 (L) 3.5 - 5.1 mmol/L    Chloride 109 (H) 97 - 108 mmol/L    CO2 32 21 - 32 mmol/L    Anion gap 4 (L) 5 - 15 mmol/L    Glucose 185 (H) 65 - 100 mg/dL    BUN 6 6 - 20 mg/dL    Creatinine 0.47 (L) 0.55 - 1.02 mg/dL    BUN/Creatinine ratio 13 12 - 20      eGFR >60 >60 ml/min/1.73m2    Calcium 7.7 (L) 8.5 - 10.1 mg/dL     CT Abdomen (2/25)        Physical Exam  Vitals and nursing note reviewed. Constitutional:       General: She is not in acute distress. Appearance: She is ill-appearing. HENT:      Head: Normocephalic and atraumatic. Right Ear: External ear normal.      Left Ear: External ear normal.      Nose: Nose normal.      Mouth/Throat:      Pharynx: Oropharynx is clear. Eyes:      Pupils: Pupils are equal, round, and reactive to light. Cardiovascular:      Rate and Rhythm: Normal rate and regular rhythm. Heart sounds: No murmur heard. Pulmonary:      Effort: Pulmonary effort is normal.      Breath sounds: Normal breath sounds. Abdominal:      General: There is no distension. Palpations: Abdomen is soft. Tenderness: There is no abdominal tenderness. Comments: PEG tube   Genitourinary:     Comments: External urinary device  Musculoskeletal:      Cervical back: Neck supple. Right lower leg: No edema. Left lower leg: No edema. Comments: Left upper arm PICC   Skin:     Findings: No rash. Neurological:      Mental Status: She is alert. Comments: Unable to assess   Psychiatric:      Comments: Unable to assess     ASSESSMENT/PLAN    Candida cystitis with marked pyuria, secondary to Candida lusitaniae and Candida albicans, Day #6 IV Fluconazole. Sepsis with fever and elevated CRP  Large intrauterine mass  Cerebral palsy    Comment:  Candida lusitaniae is intrinsically resistant to Amphoterecin B but is usually susceptible to Fluconazole. Continue Fluconazole for 8 more days, but should be able to transition to oral formulation because of high oral bioavailability. In am, repeat CRP  Repeat urinalysis to assess response to treatment    Isaias Horowitz MD

## 2023-03-03 NOTE — PROGRESS NOTES
DC Plan: [address] for peg tube feeds       Transportation: stretcher ([facility] or [facility]) 30 minute to three hr window to set up transport (Trip# [id number])    Cm spoke with Cele Earl, with [facility] and notified him of discharge today. Transportation was discussed. Mr. Deyanira Jurado is requesting the [facility] set up stretcher transportation. Cm verified the home address pt is returning to. [facility] will deliver pt's peg tube feeds around 7 or 8 this evening. Cm called [facility] to set up transportation [phone]. Cm was in the process of setting up transportation and the phone disconnected. Cm called [facility] back to set up transportation again. CM received trip# [id number]. Cm requested [facility] or [facility] for transport. CM was informed they have a 30 minute to 3hr window to set up transport. [place] spoke with pt's sister - Emanuel Ibarra [phone] and informed her of discharge. Medicare pt has received, reviewed, and signed 2nd IM letter informing them of their right to appeal the discharge. Signed copied has been placed on pt bedside chart. Pt's sister is agreeable with Cm following up with the attending to give her a call for an update. Cm messaged attending via [employer] to contact pt's daughter. Pt's nurse is aware of dc. Discharge plan of care/case management plan validated with provider's discharge order.

## 2023-03-03 NOTE — DISCHARGE SUMMARY
Hospitalist Discharge Summary     Patient ID:    Mary Arreguin  [id number]  32 y.o.  1975    Admit date: 2/25/2023    Discharge date : 3/3/2023      Final Diagnoses & Acute Mgmt:    (1) Acute Gastritis: Protonix. EGD 2/26/23 by Dr. Ni Canales. S/p PEG now. Tolerating TF. HB 14 --> 11 (stable)     (2) UTI w/ E coli and Candida. Treated with IV Rocephin and IV Diflucan. Afebrile. WBC 6.     (3) Acute Covid-19: Treated with Dexamethasone. Not hypoxic. (4) History of seizures in a patient with cerebral palsy. Phenytoin switched to via PEG.     (5) GERD: protonix     (6) Hypokalemia; replaced     (7) Hypernatremia; resolved    (8) Uterine Mass; evaluated by Dr. Davis Points --> needs follow-up. - Transvaginal US:   1. Enlarged heterogeneous uterus which appears replaced by multiple large fibroids. A malignant uterine mass is difficult to exclude. GYN consultation is advised. -------------------------------------    Clinically appears well and has been stable. DC to group home with f/up with PCP. Reason for Hospitalization:   Mary Arreguin is a 56 y.o. female with PMH of cerebral palsy, cognitive disability, chronic vomiting, hypertension and seizure. Limited history due to underlying disability. She was brought to the ED from facility for evaluation of persistent vomiting and decreased oral intake. Patient has a history of vomiting and is regularly dehydrated due to decreased p.o. intake, scheduled for a PEG placement by Dr. Ni Canales next week. In the ED, vitals stable. No leukocytosis. UA indicative of UTI. Also hyponatremia and hypokalemia. Lactic acid normalized with resuscitation. COVID-positive. Discharge Medications    Current Discharge Medication List        START taking these medications    Details   phenytoin (DILANTIN) 100 mg/4 mL susp oral suspension 8.48 mL by PEG Tube route daily.   Qty: 300 mL, Refills: 1  Start date: 3/4/2023      pantoprazole (PROTONIX) 40 mg tablet Take 1 Tablet by mouth Daily (before breakfast). Via PEG  Qty: 30 Tablet, Refills: 0  Start date: 3/4/2023           CONTINUE these medications which have NOT CHANGED    Details   lamoTRIgine (LaMICtal) 200 mg tablet Take 200 mg by mouth daily. mirtazapine (REMERON) 7.5 mg tablet Take 15 mg by mouth nightly. STOP taking these medications       phenytoin ER (DILANTIN ER) 30 mg ER capsule Comments:   Reason for Stopping:         metoprolol tartrate (LOPRESSOR) 25 mg tablet Comments:   Reason for Stopping:         phenytoin ER (DILANTIN ER) 100 mg ER capsule Comments:   Reason for Stopping: Follow up Care    1. Corine Ho MD in <1 week  2. Dr. Tatiana Herbert    Patient Follow Up Instructions: Activity: Activity as tolerated  Diet:  PEG feeding at 50 ml/hr    Condition at Discharge:  Stable    Disposition  Home or Self Care    Code Status:  Full Code    Discharge Exam:  Patient seen and examined by me on discharge day. NAD  VS, Afebrile. O2 Sat 99% RA  Lungs CTA ant  Heart S1S2  Abd: fullness, Peg; NT  Neuro: chronic changes  Psych cannot be assessed    CONSULTATIONS: GI, Gyne    Significant Diagnostic Studies:   Recent Results (from the past 24 hour(s))   METABOLIC PANEL, BASIC    Collection Time: 03/02/23  7:45 PM   Result Value Ref Range    Sodium 145 136 - 145 mmol/L    Potassium 3.4 (L) 3.5 - 5.1 mmol/L    Chloride 109 (H) 97 - 108 mmol/L    CO2 32 21 - 32 mmol/L    Anion gap 4 (L) 5 - 15 mmol/L    Glucose 185 (H) 65 - 100 mg/dL    BUN 6 6 - 20 mg/dL    Creatinine 0.47 (L) 0.55 - 1.02 mg/dL    BUN/Creatinine ratio 13 12 - 20      eGFR >60 >60 ml/min/1.73m2    Calcium 7.7 (L) 8.5 - 10.1 mg/dL     US PELV NON OBS   Final Result      1. Enlarged heterogeneous uterus which appears replaced by multiple large   fibroids. A malignant uterine mass is difficult to exclude. GYN consultation is   advised. CT ABD PELV W CONT   Final Result   No bowel obstruction.    Large heterogeneous intrauterine mass measuring 10.0 x 6.9 cm concerning for   uterine malignancy. XR CHEST PORT   Final Result   No acute process.           Total DC Time and Coordination of Care: 35 mins    Signed:  Mark Guerra MD  3/3/2023  2:44 PM

## 2023-03-03 NOTE — PROGRESS NOTES
Nutrition Assessment     Type and Reason for Visit: Reassess (Interim, TF)    Nutrition Recommendations/Plan:   NPO, advance TF via PEG as Jevity 1.5 Cornelio to new goal rate of 60 mL/hr. Decrease water flushes to 145 mL water Q4H via PEG. Provides 1800 kcal (113%), 77 gm PRO (81%), 1782 mL Fluids (112%). HOLD TF 2hr before and 2hr after for daily phenytoin administration. Dc prosource. Continue to monitor and document TF rate, flushes, tolerance, BMs. Nutrition Assessment:   Admitted for intractable N/V and decreased PO intake. (2/25) CT abd w/ incidental findings of pelvic mass. Noted EGD and PEG placement 2/27, +gastritis. +h/o decreased PO intake PTA per Group Home. Recs for TF above. (3/3) TF tolerated at goal rate. Unable to verify Prosource provision. Noted scheduled phenytoin x1/day to restart today, RD to modify regimen and dc prosource. Labs: , Cr 0.47, Ca 7.7. Meds: PPI, remeron, rocephin, lamictal.    Malnutrition Assessment:  Malnutrition Status: Mild malnutrition     Estimated Daily Nutrient Needs:  Energy (kcal):  1011-7420 kcal/day (MSJ; 1.0-1.2 Stress Factor)  Protein (g):   g/day (1.4-1.5 g/kf, for CP, infection)       Fluid (ml/day):  6592-1803 ml/kcal    Nutrition Related Findings:  Remains on isolation. No N/V/D/C reported. NPO w/ PEG feeds as sole nutrition source. Last BM 3/2. No edema. Current Nutrition Therapies:  DIET NPO  ADULT TUBE FEEDING PEG; Standard with Fiber; Delivery Method: Continuous; Continuous Initial Rate (mL/hr): 20; Continuous Advance Tube Feeding: Yes; Advancement Volume (mL/hr): 10; Advancement Frequency: Q 4 hours; Continuous Goal Rate (mL/hr): 55. ..     Anthropometric Measures:  Height:  (P) 4' 11.02\" (149.9 cm)  Current Body Wt:  (P) 68 kg (149 lb 14.6 oz)  BMI: (P) 30.3    Nutrition Diagnosis:   Inadequate oral intake related to altered GI function as evidenced by nausea, vomiting, poor intake prior to admission, nutrition support-enteral nutrition    Nutrition Interventions:   Food and/or Nutrient Delivery: Continue NPO, Modify tube feeding, Discontinue oral nutrition supplement  Nutrition Education/Counseling: Education not appropriate  Coordination of Nutrition Care: Continue to monitor while inpatient  Plan of Care discussed with: RN    Goals:  Previous Goal Met: Goal(s) achieved  Goals: Meet at least 75% of estimated needs, Tolerate nutrition support at goal rate, by next RD assessment    Nutrition Monitoring and Evaluation:   Behavioral-Environmental Outcomes: None identified  Food/Nutrient Intake Outcomes: Enteral nutrition intake/tolerance, Vitamin/mineral intake  Physical Signs/Symptoms Outcomes: Biochemical data, Hemodynamic status, Weight    Discharge Planning:    Enteral nutrition    Sindhu Burns RD  Contact: [phone]. [phone].

## 2023-03-03 NOTE — PROGRESS NOTES
PICC line removed per order. 35cm catheter intact. Pressure applied 5 min. Covered with 4x4 and tegaderm.

## 2023-03-04 NOTE — ROUTINE PROCESS
Transport to  patient take back to group home. Report given by Elias Morales removed her tube feeding and did a flush and locked it off. I cleaned her put a diaper on her and no iv and not tele on her at time.

## 2023-04-22 ENCOUNTER — HOSPITAL ENCOUNTER (EMERGENCY)
Age: 48
Discharge: HOME | End: 2023-04-22
Attending: EMERGENCY MEDICINE
Payer: MEDICARE

## 2023-04-22 VITALS
OXYGEN SATURATION: 98 % | HEART RATE: 100 BPM | SYSTOLIC BLOOD PRESSURE: 127 MMHG | BODY MASS INDEX: 30.24 KG/M2 | WEIGHT: 150 LBS | TEMPERATURE: 98.7 F | DIASTOLIC BLOOD PRESSURE: 92 MMHG | HEIGHT: 59 IN | RESPIRATION RATE: 16 BRPM

## 2023-04-22 DIAGNOSIS — K94.23 PEG TUBE MALFUNCTION (HCC): Primary | ICD-10-CM

## 2023-04-22 PROCEDURE — 99283 EMERGENCY DEPT VISIT LOW MDM: CPT

## 2023-04-22 NOTE — DISCHARGE INSTRUCTIONS
We attempted to replace the PEG tube however the tract has already closed significantly and unable to place any 2. Spoke with interventional radiology who states that they can do this on an outpatient basis on Monday. We have placed a consult and somebody will call the group home about outpatient procedure.

## 2023-04-22 NOTE — ED PROVIDER NOTES
[facility] [place] EMERGENCY DEPT  EMERGENCY DEPARTMENT HISTORY AND PHYSICAL EXAM      Date: 4/22/2023  Patient Name: Daylin Ramos  MRN: [id number]  [place]: 1975  Date of evaluation: 4/22/2023  Provider: Joann Hannon MD   Note Started: 11:03 AM 4/22/23    HISTORY OF PRESENT ILLNESS     Chief Complaint   Patient presents with    Feeding Tube Problem       History Provided By: EMS    HPI: Daylin Ramos is a 56 y.o. female with history of cerebral palsy presenting for PEG tube problem. According to EMS, patient pulled out her PEG tube overnight and needed it replaced. They reported that it was a 20 Western Kimmie PEG tube. Does still get food by mouth. PAST MEDICAL HISTORY   Past Medical History:  Past Medical History:   Diagnosis Date    Cerebral palsy ([address]     HTN (hypertension)     Microcephalic ([address]     Seizure ([facility]        Past Surgical History:  No past surgical history on file. Family History:  No family history on file. Social History: Allergies:  No Known Allergies    PCP: Derick Harris MD    Current Meds:   Previous Medications    LAMOTRIGINE (LAMICTAL) 200 MG TABLET    Take 200 mg by mouth daily. MIRTAZAPINE (REMERON) 7.5 MG TABLET    Take 15 mg by mouth nightly. PANTOPRAZOLE (PROTONIX) 40 MG TABLET    Take 1 Tablet by mouth Daily (before breakfast). Via PEG    PHENYTOIN (DILANTIN) 100 MG/4 ML SUSP ORAL SUSPENSION    8.48 mL by PEG Tube route daily. PHYSICAL EXAM     ED Triage Vitals [04/22/23 0751]   ED Encounter Vitals Group      BP (!) 169/115      Pulse (Heart Rate) 100      Resp Rate 16      Temp 98.7 °F (37.1 °C)      Temp src       O2 Sat (%) 100 %      Weight 150 lb      Height 4' 11\"      Physical Exam  Constitutional:       General: She is not in acute distress. Appearance: She is well-developed. Comments: Features of CP   HENT:      Head: Normocephalic and atraumatic.       Nose: Nose normal.      Mouth/Throat:      Mouth: Mucous membranes are moist. Eyes:      Extraocular Movements: Extraocular movements intact. Conjunctiva/sclera: Conjunctivae normal.   Cardiovascular:      Comments: Well perfused  Pulmonary:      Effort: Pulmonary effort is normal. No respiratory distress. Abdominal:      Comments: Nontender abdomen small hole where the PEG tube used to be. Very small in size. Musculoskeletal:         General: Normal range of motion. Cervical back: Normal range of motion. Neurological:      General: No focal deficit present. Mental Status: She is alert and oriented to person, place, and time. Psychiatric:         Mood and Affect: Mood normal.         SCREENINGS              LAB, EKG AND DIAGNOSTIC RESULTS   Labs:  No results found for this or any previous visit (from the past 12 hour(s)). EKG: Initial EKG interpreted by me. Not Applicable    Radiologic Studies:  Non-plain film images such as CT, Ultrasound and MRI are read by the radiologist. Plain radiographic images are visualized and preliminarily interpreted by the ED Physician with the following findings: Not Applicable    Interpretation per the Radiologist below, if available at the time of this note:  No results found. PROCEDURES   Unless otherwise noted below, none. Procedures      CRITICAL CARE TIME   Patient does not meet Critical Care Time, 0 minutes    ED COURSE and DIFFERENTIAL DIAGNOSIS/MDM   CC/HPI/PE Summary, DDx: Patient presenting for PEG tube replacement. We will try to do a Western Kimmie tube 20 though seems small.     Records Reviewed (source and summary of external notes): Prior medical records and Nursing notes    Vitals:    Vitals:    04/22/23 0751 04/22/23 1059   BP: (!) 169/115 (!) 127/92   Pulse: 100    Resp: 16 16   Temp: 98.7 °F (37.1 °C)    SpO2: 100% 98%   Weight: 68 kg (150 lb)    Height: 4' 11\" (1.499 m)         ED COURSE  ED Course as of 04/22/23 1103   Sat Apr 22, 2023   0824 Attempted to place a 20 Western Kimmie PEG tube as that was what was reported as the size of her PEG tube however the tract is very small and unable to pass a 20 Western Kimmie. The smallest we had adult size was 12 Western Kimmie and that was still too small. We will attempt a 5 [place] Warren to keep the tract open. Waiting on group home staff given that this PEG was not pulled just this morning and had to have been longer. [JS]   1010 Nurse has been attempting to try to find a small enough Warren or 2. There are no G-tubes that are smaller than a 16 Western Kimmie and the entire hospital and NICU, labor and delivery, no one seems to have small enough Warren to place either. [JS]   36 Spoke with Dr. Tati Chicas, interventional radiology at [facility] who stated that over the weekend they only do emergent cases not nonemergent cases. Even though this happened today, patient can wait until Monday for IR to do a PEG replacement outpatient. Place a consult and they will call them about doing it outpatient. [JS]      ED Course User Index  [JS] Candida Juarez MD       Disposition Considerations (Tests not done, Shared Decision Making, Pt Expectation of Test or Treatment.): Not Applicable    Patient was given the following medications:  Medications - No data to display    CONSULTS: (Who and What was discussed)  IP CONSULT TO INTERVENTIONAL RADIOLOGY     Social Determinants affecting Dx or Tx: None    Smoking Cessation: Not Applicable    FINAL IMPRESSION     1. PEG tube malfunction [facility])          DISPOSITION/PLAN   Discharged    Discharge Note: The patient is stable for discharge home. The signs, symptoms, diagnosis, and discharge instructions have been discussed, understanding conveyed, and agreed upon. The patient is to follow up as recommended or return to ER should their symptoms worsen.       PATIENT REFERRED TO:  Follow-up Information       Follow up With Specialties Details Why Contact Info    Interventional Radiology                  DISCHARGE MEDICATIONS:  Current Discharge Medication List            DISCONTINUED MEDICATIONS:  Current Discharge Medication List          I am the Primary Clinician of Record: Keith Staples MD (electronically signed)    (Please note that parts of this dictation were completed with voice recognition software. Quite often unanticipated grammatical, syntax, homophones, and other interpretive errors are inadvertently transcribed by the computer software. Please disregards these errors.  Please excuse any errors that have escaped final proofreading.)

## 2023-04-27 ENCOUNTER — ANESTHESIA EVENT (OUTPATIENT)
Dept: ENDOSCOPY | Age: 48
End: 2023-04-27
Payer: MEDICARE

## 2023-04-27 NOTE — ANESTHESIA PREPROCEDURE EVALUATION
Relevant Problems   No relevant active problems       Anesthetic History     PONV          Review of Systems / Medical History  Patient summary reviewed, nursing notes reviewed and pertinent labs reviewed    Pulmonary  Within defined limits                 Neuro/Psych     seizures        Comments: [facility]izure ([facility]  Cerebral palsy ([facility]  Microcephalic ([facility] Cardiovascular    Hypertension                   GI/Hepatic/Renal               Comments: Chronic Vomiting  Not eating or drinking - Failure to Thrive Endo/Other        Obesity     Other Findings            Past Medical History:   Diagnosis Date    [facility] ([facility]     HTN (hypertension)     Microcephalic ([facility]     Seizure ([facility]        No past surgical history on file. Current Outpatient Medications   Medication Instructions    lamoTRIgine (LAMICTAL) 200 mg, Oral, DAILY    mirtazapine (REMERON) 15 mg, Oral, EVERY BEDTIME    pantoprazole (PROTONIX) 40 mg, Oral, DAILY BEFORE BREAKFAST, Via PEG    phenytoin (DILANTIN) 212 mg, PEG Tube, DAILY       No current facility-administered medications for this encounter. Current Outpatient Medications   Medication Sig    phenytoin (DILANTIN) 100 mg/4 mL susp oral suspension 8.48 mL by PEG Tube route daily.  pantoprazole (PROTONIX) 40 mg tablet Take 1 Tablet by mouth Daily (before breakfast). Via PEG    lamoTRIgine (LaMICtal) 200 mg tablet Take 200 mg by mouth daily.  mirtazapine (REMERON) 7.5 mg tablet Take 15 mg by mouth nightly. No data found.     Lab Results   Component Value Date/Time    WBC 6.0 03/01/2023 07:26 AM    HGB 11.4 (L) 03/01/2023 07:26 AM    HCT 35.9 03/01/2023 07:26 AM    PLATELET 140 75/86/4611 07:26 AM    MCV 93.7 03/01/2023 07:26 AM     Lab Results   Component Value Date/Time    Sodium 143 03/03/2023 03:25 PM    Potassium 3.2 (L) 03/03/2023 03:25 PM    Chloride 108 03/03/2023 03:25 PM    CO2 34 (H) 03/03/2023 03:25 PM    Anion gap 1 (L) 03/03/2023 03:25 PM    Glucose 120 (H) 03/03/2023 03:25 PM    BUN 7 03/03/2023 03:25 PM    Creatinine 0.43 (L) 03/03/2023 03:25 PM    BUN/Creatinine ratio 16 03/03/2023 03:25 PM    Calcium 7.6 (L) 03/03/2023 03:25 PM     No results found for: APTT, PTP, INR, INREXT  Lab Results   Component Value Date/Time    Glucose 120 (H) 03/03/2023 03:25 PM         Physical Exam    Airway  Mallampati: III  TM Distance: 4 - 6 cm  Neck ROM: normal range of motion   Mouth opening: Normal     Cardiovascular    Rhythm: regular  Rate: normal         Dental    Dentition: Poor dentition     Pulmonary  Breath sounds clear to auscultation               Abdominal  GI exam deferred       Other Findings            Anesthetic Plan    ASA: 4  Anesthesia type: total IV anesthesia and MAC    Monitoring Plan: Continuous noninvasive hemodynamic monitoring      Induction: Intravenous  Anesthetic plan and risks discussed with: Patient

## 2023-04-28 ENCOUNTER — HOSPITAL ENCOUNTER (OUTPATIENT)
Age: 48
Setting detail: OUTPATIENT SURGERY
Discharge: HOME | End: 2023-04-28
Attending: INTERNAL MEDICINE | Admitting: INTERNAL MEDICINE
Payer: MEDICARE

## 2023-04-28 ENCOUNTER — ANESTHESIA (OUTPATIENT)
Dept: ENDOSCOPY | Age: 48
End: 2023-04-28
Payer: MEDICARE

## 2023-04-28 ENCOUNTER — APPOINTMENT (OUTPATIENT)
Dept: ENDOSCOPY | Age: 48
End: 2023-04-28
Attending: INTERNAL MEDICINE
Payer: MEDICARE

## 2023-04-28 VITALS
OXYGEN SATURATION: 100 % | HEART RATE: 101 BPM | HEIGHT: 59 IN | DIASTOLIC BLOOD PRESSURE: 119 MMHG | SYSTOLIC BLOOD PRESSURE: 162 MMHG | RESPIRATION RATE: 16 BRPM | BODY MASS INDEX: 30.22 KG/M2 | TEMPERATURE: 97.5 F | WEIGHT: 149.91 LBS

## 2023-04-28 PROCEDURE — 76060000032 HC ANESTHESIA 0.5 TO 1 HR: Performed by: INTERNAL MEDICINE

## 2023-04-28 PROCEDURE — 2709999900 HC NON-CHARGEABLE SUPPLY: Performed by: INTERNAL MEDICINE

## 2023-04-28 PROCEDURE — 77030012058: Performed by: INTERNAL MEDICINE

## 2023-04-28 PROCEDURE — 76040000007: Performed by: INTERNAL MEDICINE

## 2023-04-28 PROCEDURE — 74011250636 HC RX REV CODE- 250/636: Performed by: NURSE ANESTHETIST, CERTIFIED REGISTERED

## 2023-04-28 PROCEDURE — 74011000250 HC RX REV CODE- 250: Performed by: NURSE ANESTHETIST, CERTIFIED REGISTERED

## 2023-04-28 PROCEDURE — 77030008727 HC TU G REPLMT BSC -B: Performed by: INTERNAL MEDICINE

## 2023-04-28 RX ORDER — MIDAZOLAM HYDROCHLORIDE 1 MG/ML
INJECTION, SOLUTION INTRAMUSCULAR; INTRAVENOUS
Status: COMPLETED
Start: 2023-04-28 | End: 2023-04-28

## 2023-04-28 RX ORDER — KETAMINE HYDROCHLORIDE 10 MG/ML
INJECTION INTRAMUSCULAR; INTRAVENOUS AS NEEDED
Status: DISCONTINUED | OUTPATIENT
Start: 2023-04-28 | End: 2023-04-28 | Stop reason: HOSPADM

## 2023-04-28 RX ORDER — MIDAZOLAM HYDROCHLORIDE 1 MG/ML
INJECTION, SOLUTION INTRAMUSCULAR; INTRAVENOUS AS NEEDED
Status: DISCONTINUED | OUTPATIENT
Start: 2023-04-28 | End: 2023-04-28 | Stop reason: HOSPADM

## 2023-04-28 RX ORDER — SODIUM CHLORIDE, SODIUM LACTATE, POTASSIUM CHLORIDE, CALCIUM CHLORIDE 600; 310; 30; 20 MG/100ML; MG/100ML; MG/100ML; MG/100ML
50 INJECTION, SOLUTION INTRAVENOUS CONTINUOUS
Status: DISCONTINUED | OUTPATIENT
Start: 2023-04-28 | End: 2023-05-01 | Stop reason: HOSPADM

## 2023-04-28 RX ORDER — CEFAZOLIN SODIUM 1 G/3ML
INJECTION, POWDER, FOR SOLUTION INTRAMUSCULAR; INTRAVENOUS AS NEEDED
Status: DISCONTINUED | OUTPATIENT
Start: 2023-04-28 | End: 2023-04-28 | Stop reason: HOSPADM

## 2023-04-28 RX ORDER — KETAMINE HYDROCHLORIDE 50 MG/ML
INJECTION INTRAMUSCULAR; INTRAVENOUS
Status: DISCONTINUED
Start: 2023-04-28 | End: 2023-04-28 | Stop reason: HOSPADM

## 2023-04-28 RX ADMIN — KETAMINE HYDROCHLORIDE 150 MG: 10 INJECTION INTRAMUSCULAR; INTRAVENOUS at 11:39

## 2023-04-28 RX ADMIN — CEFAZOLIN SODIUM 1 G: 1 INJECTION, POWDER, FOR SOLUTION INTRAMUSCULAR; INTRAVENOUS at 11:53

## 2023-04-28 RX ADMIN — MIDAZOLAM HYDROCHLORIDE 2 MG: 2 INJECTION, SOLUTION INTRAMUSCULAR; INTRAVENOUS at 11:48

## 2023-04-28 NOTE — ANESTHESIA POSTPROCEDURE EVALUATION
Procedure(s):  PERCUTANEOUS ENDOSCOPIC GASTROSTOMY TUBE INSERTION  ESOPHAGOGASTRODUODENOSCOPY (EGD). total IV anesthesia, MAC    Anesthesia Post Evaluation      Multimodal analgesia: multimodal analgesia not used between 6 hours prior to anesthesia start to PACU discharge  Patient location during evaluation: bedside (Endoscopy suite)  Patient participation: complete - patient cannot participate  Level of consciousness: sleepy but conscious  Pain score: 0  Pain management: adequate  Airway patency: patent  Anesthetic complications: no  Cardiovascular status: acceptable  Respiratory status: acceptable and nasal cannula  Hydration status: acceptable  Comments: This patient remained on the stretcher. The patient was handed off to the endoscopy nursing team.  All questions regarding pre-, intra-, and postoperative care were answered.   Post anesthesia nausea and vomiting:  none  Final Post Anesthesia Temperature Assessment:  Normothermia (36.0-37.5 degrees C)      INITIAL Post-op Vital signs:   Vitals Value Taken Time   /103 04/28/23 1207   Temp 36.8 °C (98.2 °F) 04/28/23 1207   Pulse 102 04/28/23 1207   Resp 16 04/28/23 1207   SpO2 100 % 04/28/23 1207

## 2023-05-24 RX ORDER — LAMOTRIGINE 200 MG/1
200 TABLET ORAL DAILY
COMMUNITY

## 2023-05-24 RX ORDER — PHENYTOIN 125 MG/5ML
212 SUSPENSION ORAL DAILY
COMMUNITY
Start: 2023-03-04

## 2023-05-24 RX ORDER — PANTOPRAZOLE SODIUM 40 MG/1
40 TABLET, DELAYED RELEASE ORAL
COMMUNITY
Start: 2023-03-04

## 2023-05-24 RX ORDER — MIRTAZAPINE 7.5 MG/1
2 TABLET, FILM COATED ORAL NIGHTLY
COMMUNITY

## 2024-07-31 ENCOUNTER — HOSPITAL ENCOUNTER (OUTPATIENT)
Facility: HOSPITAL | Age: 49
Discharge: HOME | End: 2024-07-31
Attending: SURGERY
Payer: MEDICAID

## 2024-07-31 VITALS
HEART RATE: 92 BPM | RESPIRATION RATE: 17 BRPM | TEMPERATURE: 97.1 F | DIASTOLIC BLOOD PRESSURE: 103 MMHG | SYSTOLIC BLOOD PRESSURE: 159 MMHG

## 2024-07-31 DIAGNOSIS — L89.152 DECUBITUS ULCER OF SACRAL REGION, STAGE 2 (HCC): Primary | ICD-10-CM

## 2024-07-31 PROCEDURE — 97597 DBRDMT OPN WND 1ST 20 CM/<: CPT

## 2024-07-31 PROCEDURE — 99214 OFFICE O/P EST MOD 30 MIN: CPT

## 2024-07-31 RX ORDER — HYDROXYZINE HYDROCHLORIDE 25 MG/1
25 TABLET, FILM COATED ORAL 2 TIMES DAILY PRN
COMMUNITY

## 2024-07-31 RX ORDER — ERGOCALCIFEROL 1.25 MG/1
50000 CAPSULE ORAL WEEKLY
COMMUNITY

## 2024-07-31 RX ORDER — MEDROXYPROGESTERONE ACETATE 150 MG/ML
150 INJECTION, SUSPENSION INTRAMUSCULAR
COMMUNITY

## 2024-07-31 RX ORDER — BACLOFEN 10 MG/1
10 TABLET ORAL 2 TIMES DAILY
COMMUNITY

## 2024-07-31 NOTE — PROGRESS NOTES
[facility]   History and Physical Note   Referring Provider: Fan Haq MD  Reason for Referral: Sacral ulcer    Summer Calderón  MEDICAL RECORD NUMBER:  [id number]  AGE: 48 y.o.   GENDER: female  : 1975  EPISODE DATE:  2024    Chief complaint and reason for visit:     Chief Complaint   Patient presents with    Wound Check     Sacral wound          HISTORY of PRESENT ILLNESS HPI     Summer Calderón is a 48 y.o. female who presents today for an initial evaluation of a wound/ulcer. Patient is new to me but not to the wound center . Wound duration:  3 week(s).    History of Wound Context: Patient is a resident of a group home with CP/MRDD who has been receiving Medihoney to her wound until her caregiver brought her in.    Pertinent associated symptoms:  Facility has started working to decrease pressure at the site, and she receives daily nutritional shakes.    PAST MEDICAL HISTORY        Diagnosis Date    Cerebral palsy (HCC)     HTN (hypertension)     Microcephalic (HCC)     Seizure (HCC)        PAST SURGICAL HISTORY  Past Surgical History:   Procedure Laterality Date    GASTROSTOMY TUBE PLACEMENT      GASTROSTOMY TUBE PLACEMENT N/A 2023    EGD PEG TUBE PLACEMENT performed by Vida Womack MD at [facility] ENDOSCOPY       FAMILY HISTORY  History reviewed. No pertinent family history.    SOCIAL HISTORY  Social History     Tobacco Use    Smoking status: Never    Smokeless tobacco: Never    Tobacco comments:     Nonverbal patient    Vaping Use    Vaping Use: Never used   Substance Use Topics    Alcohol use: Never    Drug use: Never       ALLERGIES  No Known Allergies    MEDICATIONS  Current Outpatient Medications on File Prior to Encounter   Medication Sig Dispense Refill    baclofen (LIORESAL) 10 MG tablet Take 1 tablet by mouth 2 times daily      ergocalciferol (ERGOCALCIFEROL) 1.25 MG (16288 UT) capsule Take 1 capsule by mouth once a week Every Friday

## 2024-07-31 NOTE — DISCHARGE INSTRUCTIONS
Wound Clinic Physician Orders and Discharge Instructions  [facility]  [address], [address]  [place] [address]  Telephone: [phone]     FAX [phone]    NAME:  Summer Calderón  YOB: 1975  MEDICAL RECORD NUMBER:  [id number]  DATE:  7/31/2024      Return Appointment:  [x] Dressing Supply Provider: Roman  [] Home Healthcare:  [x] Return Appointment:  1    Week(s)  [] Nurse Visit:     [] Discharge from [facility]:         [] Healed        [] Refer to Provider:         [] Consult    Follow-up Information:  [] Ordered Tests:   [] Referrals:   [] Rx:   [] Other:   [] Other:     Wound Cleansing:   Do not scrub or use excessive force.  Cleanse wound prior to applying a clean dressing with:  [x] Normal Saline   [] Keep Wound Dry in Shower     [] Wound Cleanser   [x] Cleanse wound with Mild Soap & Water   [] Other: Triamcinolone   [] Other:     Topical Treatments:  Do not apply lotions, creams, or ointments to wound bed unless directed.   [] Apply moisturizing lotion to skin surrounding the wound prior to dressing change.  [] Apply antifungal ointment to skin surrounding the wound prior to dressing change.  [] Apply thin film of moisture barrier ointment to skin immediately around wound.  [] Apply Betadine to skin immediately around wound   [] Other:      Dressings:           Wound Location   sacrum  [x] Apply Primary Dressing:       [] MediHoney Gel                              [] MediHoney Alginate  [x] Calcium Alginate with Silver               [] Calcium Alginate without silver   [] Collagen with silver                  [] Collagen without Silver    [] Santyl with moist saline gauze        [] Betadine wet to dry  [] Hydrofera Blue Ready (cut to size)       [] Hydrofera Blue (cut to size and moistened with normal saline)      [] Normal Saline wet to dry      [] Hydrogel      [] Bactroban/Mupirocin   [] Iodoform Packing Strip [] Plain Packing Strip   [] Skin Sub:

## 2024-07-31 NOTE — WOUND CARE
Wound Care Supplies      Supply Company:     [employer] [address] [place] [address] f: [phone] f: [phone] p: [phone] [email or web address]      Ordering Center:     [facility]  [address] [place] [address], [address] [place] [address]    Phone: [phone]  Fax: [phone]    Patient Information:      Ankush Calderón  [address] [facility] [address]   [phone]   : 1975  AGE: 48 y.o.     GENDER: female   EPISODE DATE: 2024    Insurance:      PRIMARY INSURANCE:  Plan: [employer]  Coverage: [place] [employer]  Effective Date: 2023  Group Number: [unfilled]  Subscriber Number: [id number] - (Medicaid Managed)    Payer/Plan Subscr  Sex Relation Sub. Ins. ID Effective Group Num   1. BRYNN MEDIC* ANKUSH CALDERÓN 1975 Female Self [id number]                                    [address]       Patient Wound Information:      Problem List Items Addressed This Visit    None      WOUNDS REQUIRING DRESSING SUPPLIES:     Wound 24 Sacrum #1 (Active)   Wound Image   24 1021   Wound Etiology Pressure Stage 3 24 1021   Dressing Status Other (Comment) 24 1021   Wound Cleansed Cleansed with saline 24 1021   Wound Length (cm) 0.7 cm 24 1021   Wound Width (cm) 0.2 cm 24 1021   Wound Depth (cm) 0.1 cm 24 1021   Wound Surface Area (cm^2) 0.14 cm^2 24 1021   Wound Volume (cm^3) 0.014 cm^3 24 1021   Post-Procedure Length (cm) 0.7 cm 24 1035   Post-Procedure Width (cm) 0.2 cm 24 1035   Post-Procedure Depth (cm) 0.2 cm 24 1035   Post-Procedure Surface Area (cm^2) 0.14 cm^2 24 1035   Post-Procedure Volume (cm^3) 0.028 cm^3 24 1035   Wound Assessment Granulation tissue;Slough 24 1021   Drainage Amount Moderate (25-50%) 24 1021   Drainage Description Serosanguinous 24 1021   Odor None 24 1021 
                       [] Mepitel               [] Other: skin prep  [] Other:    [x] Cover and Secure with:     [x] Gauze                                    [] Neyda            [] Kerlix   [x] Zetuvit Plus Silicone Border [] Super Absorbant [] ABD     [] Ace Wrap                                    [] Other:    [x] Change dressing: [] Daily                                    [] Once a week                                     [x] Every Other Day                                  [] Do Not Change Dressing                                     [] Twice per week                                    [] Other:                                     [] Three times per week          Negative Pressure:           Wound Location:   [] Pressure @  mm/Hg  []Continuous     []Intermittent   [] Black Foam  [] White Foam [] Bridge  [] Change dressing 3 times per week     [] Other:     Pressure Relief:  [x] When sitting, shift position or do seat lifts every 15 minutes.  [x] Wheelchair cushion [x] Specialty Bed/Mattress  [x] Turn every 2 hours when in bed.  Avoid direct pressure on wound site.  Limit side lying to 30 degree tilt.  Limit HOB elevation to 30 degrees.  [x] Other keep pressure off wound     Edema Control:  Apply: [] Compression Stocking:  mmHg  []Right Leg []Left Leg   [] Tubigrip []Right Leg Double Layer []Left Leg Double Layer      []Right Leg Single Layer []Left Leg Single Layer      [] Elevate leg(s) above the level of the heart when sitting.    [] Avoid prolonged standing in one place.     Compression:  Apply: [] Compression Wrap to []RightLeg []Left Leg   []  Coflex                [] Coflex Lite              [] Unna with Calamine Lite   [] Do not get leg(s) with wrap wet. If wrap becomes too tight, call the wound center and remove wrap from leg by unrolling each layer.   [] Elevate leg(s) above the level of the heart when sitting.    [] Avoid prolonged standing in one place.    Off-Loading:   [] Off-loading when: []

## 2024-08-07 ENCOUNTER — HOSPITAL ENCOUNTER (OUTPATIENT)
Facility: HOSPITAL | Age: 49
Discharge: HOME | End: 2024-08-07
Attending: SURGERY
Payer: MEDICAID

## 2024-08-07 VITALS
DIASTOLIC BLOOD PRESSURE: 83 MMHG | SYSTOLIC BLOOD PRESSURE: 126 MMHG | HEART RATE: 122 BPM | TEMPERATURE: 97.3 F | RESPIRATION RATE: 16 BRPM

## 2024-08-07 DIAGNOSIS — L89.152 DECUBITUS ULCER OF SACRAL REGION, STAGE 2 (HCC): Primary | ICD-10-CM

## 2024-08-07 PROCEDURE — 99212 OFFICE O/P EST SF 10 MIN: CPT

## 2024-08-07 NOTE — PROGRESS NOTES
tests)  Obtaining and/or reviewing separately obtained history  Performing a medically necessary appropriate examination and/or evaluation  Counseling and educating the patient/family/caregiver  Ordering medications, tests, or procedures  Referring and communicating with other health care professionals as needed  Documenting clinical information in the electronic or other health record  Independently interpreting results (not reported separately) and communicating results to the patient/family/caregiver  Care coordination (not reported separately)    Objective:    /83   Pulse (!) 122   Temp 97.3 °F (36.3 °C) (Temporal)   Resp 16   Wt Readings from Last 3 Encounters:   06/14/23 59 kg (130 lb)   02/20/23 68 kg (150 lb)       PHYSICAL EXAM  General: Alert and in no acute distress. Nontoxic  Skin: Warm and dry, no rash  Head: Microcephalic and atraumatic  Eyes: Conjunctivae normal, and sclera anicteric  ENT: Hearing grossly normal bilaterally. Normal appearance  Respiratory: no chest wall tenderness. no respiratory distress  GI: Abdomen non-tender and benign  Musculoskeletal: Baseline range of motion in joints. Nontender calves. No cyanosis. Edema negative.  Neurologic: Speech at baseline. Mental status at baseline    PAST MEDICAL HISTORY        Diagnosis Date    Cerebral palsy (HCC)     HTN (hypertension)     Microcephalic (HCC)     Seizure (HCC)        PAST SURGICAL HISTORY    Past Surgical History:   Procedure Laterality Date    GASTROSTOMY TUBE PLACEMENT      GASTROSTOMY TUBE PLACEMENT N/A 6/14/2023    EGD PEG TUBE PLACEMENT performed by Vida Womack MD at [facility] ENDOSCOPY       FAMILY HISTORY    History reviewed. No pertinent family history.    SOCIAL HISTORY    Social History     Tobacco Use    Smoking status: Never    Smokeless tobacco: Never    Tobacco comments:     Nonverbal patient    Vaping Use    Vaping Use: Never used   Substance Use Topics    Alcohol use: Never    Drug use: Never       ALLERGIES    No

## 2024-08-07 NOTE — WOUND CARE
May return to full duty work                                     [] Return to work with restrictions     Physician:  [x] Dr. Jen Parisi   [] Dr. Bonita Holt  [] Dr. Paulo Leyva  [] Dr. Romi Allan  [] Dr. Oleg John  [] Dr.Silky Cortes    Nurse Case Manger:  Amelia      Wound Care Center Information: Should you experience any significant changes in your wound(s) or have questions about your wound care, please contact the Wound Center at [phone]. Our hours are Monday - Friday 8am - 4:30pm, closed all major holidays. If you need help with your wound outside these hours and cannot wait until we are again available, contact your PCP or go to the hospital emergency room.     PLEASE NOTE: IF YOU ARE UNABLE TO OBTAIN WOUND SUPPLIES, CONTINUE TO USE THE SUPPLIES YOU HAVE AVAILABLE UNTIL YOU ARE ABLE TO REACH US. IT IS MOST IMPORTANT TO KEEP THE WOUND COVERED AT ALL TIMES.

## 2024-08-07 NOTE — DISCHARGE INSTRUCTIONS
May return to full duty work                                     [] Return to work with restrictions     Physician:  [x] Dr. Jen Parisi   [] Dr. Bonita Holt  [] Dr. Paulo Leyva  [] Dr. Romi Allan  [] Dr. Oleg John  [] Dr.Silky Cortes    Nurse Case Manger:  Amelia      Wound Care Center Information: Should you experience any significant changes in your wound(s) or have questions about your wound care, please contact the Wound Center at [phone]. Our hours are Monday - Friday 8am - 4:30pm, closed all major holidays. If you need help with your wound outside these hours and cannot wait until we are again available, contact your PCP or go to the hospital emergency room.     PLEASE NOTE: IF YOU ARE UNABLE TO OBTAIN WOUND SUPPLIES, CONTINUE TO USE THE SUPPLIES YOU HAVE AVAILABLE UNTIL YOU ARE ABLE TO REACH US. IT IS MOST IMPORTANT TO KEEP THE WOUND COVERED AT ALL TIMES.

## 2024-11-14 ENCOUNTER — HOSPITAL ENCOUNTER (EMERGENCY)
Facility: HOSPITAL | Age: 49
Discharge: HOME | End: 2024-11-14
Attending: STUDENT IN AN ORGANIZED HEALTH CARE EDUCATION/TRAINING PROGRAM
Payer: MEDICAID

## 2024-11-14 VITALS
DIASTOLIC BLOOD PRESSURE: 83 MMHG | SYSTOLIC BLOOD PRESSURE: 137 MMHG | HEART RATE: 93 BPM | RESPIRATION RATE: 17 BRPM | TEMPERATURE: 98.8 F | WEIGHT: 135 LBS | OXYGEN SATURATION: 98 % | HEIGHT: 63 IN | BODY MASS INDEX: 23.92 KG/M2

## 2024-11-14 DIAGNOSIS — L03.211 FACIAL CELLULITIS: Primary | ICD-10-CM

## 2024-11-14 PROCEDURE — 99283 EMERGENCY DEPT VISIT LOW MDM: CPT

## 2024-11-14 RX ORDER — CEPHALEXIN 125 MG/5ML
25 POWDER, FOR SUSPENSION ORAL 4 TIMES DAILY
Qty: 306 ML | Refills: 0 | Status: SHIPPED | OUTPATIENT
Start: 2024-11-14 | End: 2024-11-19

## 2024-11-14 ASSESSMENT — PAIN - FUNCTIONAL ASSESSMENT: PAIN_FUNCTIONAL_ASSESSMENT: WONG-BAKER FACES

## 2024-11-14 ASSESSMENT — PAIN SCALES - WONG BAKER: WONGBAKER_NUMERICALRESPONSE: NO HURT

## 2024-11-14 NOTE — ED TRIAGE NOTES
Caregiver reports they think the R side of pt's face is swollen. Reports last week she was Dx: c ear infection to R ear and given Ofloxacin ear drops for this. Pt is nonverbal at baseline c observe physical and intellectual delays.

## 2024-11-14 NOTE — ED PROVIDER NOTES
[facility] EMERGENCY DEPT  EMERGENCY DEPARTMENT HISTORY AND PHYSICAL EXAM      Date: 11/14/2024  Patient Name: Summer Calderón  MRN: [id number]  YOB: 1975  Date of evaluation: 11/14/2024  Provider: Bruce Santiago MD   Note Started: 11:13 AM EST 11/14/24    HISTORY OF PRESENT ILLNESS     Chief Complaint   Patient presents with    Facial Swelling       History Provided By: group home caregiver    HPI: Summer Calderón is a 49 y.o. female presetns to the eD for R sided facial swelling x 1 day.  No fevers, decreased appetite, no obvious signs of pain or discomfort.  Patient was recently treated for otitis externa with ofloxacin drops, has completed treatment.    PAST MEDICAL HISTORY   Past Medical History:  Past Medical History:   Diagnosis Date    Cerebral palsy (HCC)     HTN (hypertension)     Microcephalic (HCC)     Seizure (HCC)        Past Surgical History:  Past Surgical History:   Procedure Laterality Date    GASTROSTOMY TUBE PLACEMENT      GASTROSTOMY TUBE PLACEMENT N/A 6/14/2023    EGD PEG TUBE PLACEMENT performed by Vida Womack MD at [facility] ENDOSCOPY       Family History:  No family history on file.    Social History:  Social History     Tobacco Use    Smoking status: Never    Smokeless tobacco: Never    Tobacco comments:     Nonverbal patient    Vaping Use    Vaping status: Never Used   Substance Use Topics    Alcohol use: Never    Drug use: Never       Allergies:  No Known Allergies    PCP: Fan Haq MD    Current Meds:   No current facility-administered medications for this encounter.     Current Outpatient Medications   Medication Sig Dispense Refill    cephALEXin (KEFLEX) 125 MG/5ML suspension Take 15.3 mLs by mouth 4 times daily for 5 days 306 mL 0    baclofen (LIORESAL) 10 MG tablet Take 1 tablet by mouth 2 times daily      ergocalciferol (ERGOCALCIFEROL) 1.25 MG (54493 UT) capsule Take 1 capsule by mouth once a week Every Friday      medroxyPROGESTERone (DEPO-PROVERA) 150 MG/ML injection

## (undated) DEVICE — CANNULA NSL O2 AD 7 FT END-TIDAL CARBON DIOX VENTFLO

## (undated) DEVICE — KIT ENDOSCOPIC  PROC VIA

## (undated) DEVICE — CANNULA NASAL ADULT 10FT ETCO2/CO2 VENTFLO

## (undated) DEVICE — PEG KIT STD 20 FR PUL W/ ENFIT ENDOVIVE

## (undated) DEVICE — MOUTHPIECE ENDOSCP 20X27MM --

## (undated) DEVICE — THE ENDO CARRY-ON PROCEDURE KIT CONTAINS ALL OF THE SUPPLIES AND INFECTION PREVENTION PRODUCTS NEEDED FOR ENDOSCOPIC PROCEDURES: Brand: ENDO CARRY-ON PROCEDURE KIT

## (undated) DEVICE — BINDER ABD UNIV H9IN WAIST 45-62IN E SFT COT PREM 3 PNL

## (undated) DEVICE — ENDOVIVE SFT PEG KIT PULL WENFIT 20F BX2

## (undated) DEVICE — Device